# Patient Record
Sex: FEMALE | Race: WHITE | Employment: FULL TIME | URBAN - METROPOLITAN AREA
[De-identification: names, ages, dates, MRNs, and addresses within clinical notes are randomized per-mention and may not be internally consistent; named-entity substitution may affect disease eponyms.]

---

## 2018-01-08 ENCOUNTER — OFFICE VISIT (OUTPATIENT)
Dept: INTERNAL MEDICINE | Facility: CLINIC | Age: 48
End: 2018-01-08
Payer: COMMERCIAL

## 2018-01-08 VITALS
OXYGEN SATURATION: 97 % | DIASTOLIC BLOOD PRESSURE: 78 MMHG | TEMPERATURE: 98.4 F | SYSTOLIC BLOOD PRESSURE: 116 MMHG | BODY MASS INDEX: 48.45 KG/M2 | WEIGHT: 269.2 LBS | HEART RATE: 84 BPM

## 2018-01-08 DIAGNOSIS — N92.6 IRREGULAR MENSTRUAL CYCLE: ICD-10-CM

## 2018-01-08 DIAGNOSIS — Z12.31 VISIT FOR SCREENING MAMMOGRAM: ICD-10-CM

## 2018-01-08 DIAGNOSIS — L30.9 DERMATITIS: ICD-10-CM

## 2018-01-08 DIAGNOSIS — E66.812 CLASS 2 OBESITY IN ADULT, UNSPECIFIED BMI, UNSPECIFIED OBESITY TYPE, UNSPECIFIED WHETHER SERIOUS COMORBIDITY PRESENT: ICD-10-CM

## 2018-01-08 DIAGNOSIS — Z00.00 ENCOUNTER FOR ROUTINE ADULT HEALTH EXAMINATION WITHOUT ABNORMAL FINDINGS: Primary | ICD-10-CM

## 2018-01-08 LAB
ERYTHROCYTE [DISTWIDTH] IN BLOOD BY AUTOMATED COUNT: 14.7 % (ref 10–15)
HCT VFR BLD AUTO: 39.6 % (ref 35–47)
HGB BLD-MCNC: 12.6 G/DL (ref 11.7–15.7)
IRON SATN MFR SERPL: 20 % (ref 15–46)
IRON SERPL-MCNC: 59 UG/DL (ref 35–180)
MCH RBC QN AUTO: 27.6 PG (ref 26.5–33)
MCHC RBC AUTO-ENTMCNC: 31.8 G/DL (ref 31.5–36.5)
MCV RBC AUTO: 87 FL (ref 78–100)
PLATELET # BLD AUTO: 515 10E9/L (ref 150–450)
RBC # BLD AUTO: 4.56 10E12/L (ref 3.8–5.2)
TIBC SERPL-MCNC: 294 UG/DL (ref 240–430)
TSH SERPL DL<=0.005 MIU/L-ACNC: 1.28 MU/L (ref 0.4–4)
WBC # BLD AUTO: 9.5 10E9/L (ref 4–11)

## 2018-01-08 PROCEDURE — 99213 OFFICE O/P EST LOW 20 MIN: CPT | Mod: 25 | Performed by: INTERNAL MEDICINE

## 2018-01-08 PROCEDURE — 83550 IRON BINDING TEST: CPT | Performed by: INTERNAL MEDICINE

## 2018-01-08 PROCEDURE — 77063 BREAST TOMOSYNTHESIS BI: CPT | Mod: TC

## 2018-01-08 PROCEDURE — 85027 COMPLETE CBC AUTOMATED: CPT | Performed by: INTERNAL MEDICINE

## 2018-01-08 PROCEDURE — 36415 COLL VENOUS BLD VENIPUNCTURE: CPT | Performed by: INTERNAL MEDICINE

## 2018-01-08 PROCEDURE — 83540 ASSAY OF IRON: CPT | Performed by: INTERNAL MEDICINE

## 2018-01-08 PROCEDURE — 84443 ASSAY THYROID STIM HORMONE: CPT | Performed by: INTERNAL MEDICINE

## 2018-01-08 PROCEDURE — 99396 PREV VISIT EST AGE 40-64: CPT | Performed by: INTERNAL MEDICINE

## 2018-01-08 PROCEDURE — 77067 SCR MAMMO BI INCL CAD: CPT | Mod: TC

## 2018-01-08 RX ORDER — TRIAMCINOLONE ACETONIDE 5 MG/G
CREAM TOPICAL
Qty: 30 G | Refills: 11 | Status: SHIPPED | OUTPATIENT
Start: 2018-01-08 | End: 2021-10-29

## 2018-01-08 NOTE — MR AVS SNAPSHOT
After Visit Summary   1/8/2018    Emiliana Woodall    MRN: [id number]           Patient Information     Date Of Birth          1970        Visit Information        Provider Department      1/8/2018 8:45 AM Reilly Paige MD [facility]        Today's Diagnoses     Encounter for routine adult health examination without abnormal findings    -  1    Dermatitis        Irregular menstrual cycle        Class 2 obesity in adult, unspecified BMI, unspecified obesity type, unspecified whether serious comorbidity present          Care Instructions      Preventive Health Recommendations  Female Ages 40 to 49    Yearly exam:     See your health care provider every year in order to  1. Review health changes.   2. Discuss preventive care.    3. Review your medicines if your doctor prescribed any.      Get a Pap test every three years (unless you have an abnormal result and your provider advises testing more often).      If you get Pap tests with HPV test, you only need to test every 5 years, unless you have an abnormal result. You do not need a Pap test if your uterus was removed (hysterectomy) and you have not had cancer.      You should be tested each year for STDs (sexually transmitted diseases), if you're at risk.       Ask your doctor if you should have a mammogram.      Have a colonoscopy (test for colon cancer) if someone in your family has had colon cancer or polyps before age 50.       Have a cholesterol test every 5 years.       Have a diabetes test (fasting glucose) after age 45. If you are at risk for diabetes, you should have this test every 3 years.    Shots: Get a flu shot each year. Get a tetanus shot every 10 years.     Nutrition:     Eat at least 5 servings of fruits and vegetables each day.    Eat whole-grain bread, whole-wheat pasta and brown rice instead of white grains and rice.    Talk to your provider about Calcium and Vitamin D.     Lifestyle    Exercise  at least 150 minutes a week (an average of 30 minutes a day, 5 days a week). This will help you control your weight and prevent disease.    Limit alcohol to one drink per day.    No smoking.     Wear sunscreen to prevent skin cancer.    See your dentist every six months for an exam and cleaning.          Follow-ups after your visit        Your next 10 appointments already scheduled     Feb 05, 2018  8:15 AM CST   US PELVIC COMPLETE W TRANSVAGINAL with OXUS1   [facility] ([facility])    [address] [place] [id number] [phone]           Please bring a list of your medicines (including vitamins, minerals and over-the-counter drugs). Also, tell your doctor about any allergies you may have. Wear comfortable clothes and leave your valuables at home.  Adults: Drink six 8-ounce glasses of fluid one hour before your exam. Do NOT empty your bladder.  If you need to empty your bladder before your exam, try to release only a little bit of urine. Then, drink another 8oz glass of fluid.  Children: Children who are potty trained should drink at least 4 cups (32 oz) of liquid 45 minutes to one hour prior to the exam. The child s bladder must be full in order to achieve a diagnostic exam. If your child is very uncomfortable or has an urgent need to pee, please notify a technologist; they will try to find out how much longer the wait may be and provide instructions to help relieve the pressure. Occasionally it is medically necessary to insert a urinary catheter to fill the bladder.  Please call the Imaging Department at your exam site with any questions.              Who to contact     If you have questions or need follow up information about today's clinic visit or your schedule please contact [facility] directly at [phone].  Normal or non-critical lab and imaging results will be communicated to you by MyChart, letter or phone  "within 4 business days after the clinic has received the results. If you do not hear from us within 7 days, please contact the clinic through NVEL[employer] or phone. If you have a critical or abnormal lab result, we will notify you by phone as soon as possible.  Submit refill requests through [employer]EL[employer] or call your pharmacy and they will forward the refill request to us. Please allow 3 business days for your refill to be completed.          Additional Information About Your Visit        NVELO Information     NVELO lets you send messages to your doctor, view your test results, renew your prescriptions, schedule appointments and more. To sign up, go to [email or web address] . Click on \"Log in\" on the left side of the screen, which will take you to the Welcome page. Then click on \"Sign up Now\" on the right side of the page.     You will be asked to enter the access code listed below, as well as some personal information. Please follow the directions to create your username and password.     Your access code is: [email or web address]  Expires: 4/10/2018  9:10 AM     Your access code will  in 90 days. If you need help or a new code, please call your [facility] clinic or [phone].        Care EveryWhere ID     This is your Care EveryWhere ID. This could be used by other organizations to access your [place] medical records  [id number]        Your Vitals Were     Pulse Temperature Pulse Oximetry Breastfeeding? BMI (Body Mass Index)       84 98.4  F (36.9  C) (Oral) 97% No 48.45 kg/m2        Blood Pressure from Last 3 Encounters:   18 116/78   16 110/74   16 110/74    Weight from Last 3 Encounters:   18 269 lb 3.2 oz (122.1 kg)   16 240 lb (108.9 kg)   16 240 lb 12.8 oz (109.2 kg)              We Performed the Following     CBC with platelets     Iron and iron binding capacity     OFFICE/OUTPT VISIT,EST,LEVL III     TSH with free T4 reflex          Today's Medication Changes          These " changes are accurate as of: 1/8/18 11:59 PM.  If you have any questions, ask your nurse or doctor.               Start taking these medicines.        Dose/Directions    naltrexone-bupropion 8-90 MG per 12 hr tablet   Commonly known as:  CONTRAVE   Used for:  Class 2 obesity in adult, unspecified BMI, unspecified obesity type, unspecified whether serious comorbidity present   Started by:  Reilly Paige MD        1 tablet daily x 1 week, then 1 tablet twice daily x 1 week, then  2 tabs qAM and 1 tab q PM x 1 week, then 2 tabs twice daily   Quantity:  120 tablet   Refills:  3            Where to get your medicines      These medications were sent to [place] [address]     Phone:  [phone]     naltrexone-bupropion 8-90 MG per 12 hr tablet    triamcinolone 0.5 % cream                Primary Care Provider Office Phone # Fax #    Reilly Paige [phone]       [address] [place] [address]        Equal Access to Services     DOMITILA LOZADA : Hadii jonah ku hadasho Soomaali, waaxda luqadaha, qaybta kaalmada ademagdielyada, lluvia valenzuela. So [facility] [phone].    ATENCIÓN: Si habla español, tiene a mccall disposición servicios gratuitos de asistencia lingüística. [facility] [phone].    We comply with applicable federal civil rights laws and [place] laws. We do not discriminate on the basis of race, color, national origin, age, disability, sex, sexual orientation, or gender identity.            Thank you!     Thank you for choosing [facility]  for your care. Our goal is always to provide you with excellent care. Hearing back from our patients is one way we can continue to improve our services. Please take a few minutes to complete the written survey that you may receive in the mail after your visit with us. Thank you!             Your Updated Medication  List - Protect others around you: Learn how to safely use, store and throw away your medicines at www.disposemymeds.org.          This list is accurate as of: 1/8/18 11:59 PM.  Always use your most recent med list.                   Brand Name Dispense Instructions for use Diagnosis    naltrexone-bupropion 8-90 MG per 12 hr tablet    CONTRAVE    120 tablet    1 tablet daily x 1 week, then 1 tablet twice daily x 1 week, then  2 tabs qAM and 1 tab q PM x 1 week, then 2 tabs twice daily    Class 2 obesity in adult, unspecified BMI, unspecified obesity type, unspecified whether serious comorbidity present       NO ACTIVE MEDICATIONS          .        triamcinolone 0.5 % cream    KENALOG    30 g    Apply sparingly to affected area three times daily.    Dermatitis

## 2018-01-08 NOTE — PROGRESS NOTES
SUBJECTIVE:   CC: Emiliana Woodall is an 47 year old woman who presents for preventive health visit.     Answers for HPI/ROS submitted by the patient on 1/8/2018   Annual Exam:  Getting at least 3 servings of Calcium per day:: Yes  Bi-annual eye exam:: Yes  Dental care twice a year:: Yes  Sleep apnea or symptoms of sleep apnea:: None  Diet:: Regular (no restrictions)  Frequency of exercise:: 1 day/week  Taking medications regularly:: Yes  Medication side effects:: Not applicable  Additional concerns today:: YES  PHQ-2 Score: 1  Duration of exercise:: 15-30 minutes      PROBLEMS TO ADD ON...  1. Perimenopausal symptoms     Today's PHQ-2 Score:   PHQ-2 ( 1999 Pf[employer]) 1/8/2018 8/31/2016   Q1: Little interest or pleasure in doing things 0 0   Q2: Feeling down, depressed or hopeless 1 0   PHQ-2 Score 1 0   Q1: Little interest or pleasure in doing things Not at all -   Q2: Feeling down, depressed or hopeless Several days -   PHQ-2 Score 1 -         Abuse: Current or Past(Physical, Sexual or Emotional)- No  Do you feel safe in your environment - Yes  Social History   Substance Use Topics     Smoking status: Never Smoker     Smokeless tobacco: Never Used     Alcohol use 0.0 oz/week     0 Standard drinks or equivalent per week      Comment: rarely     If you drink alcohol do you typically have >3 drinks per day or >7 drinks per week? No                     Reviewed orders with patient.  Reviewed health maintenance and updated orders accordingly - Yes      Patient under age 50, mutual decision reflected in health maintenance.        Pertinent mammograms are reviewed under the imaging tab.  History of abnormal Pap smear: NO - age 30-65 PAP every 5 years with negative HPV co-testing recommended    Reviewed and updated as needed this visit by clinical staffTobacco  Allergies  Meds  Med Hx  Surg Hx  Fam Hx  Soc Hx        Reviewed and updated as needed this visit by Provider          Patient continues to struggle with  being overweight, would like to try Contrave therapy.  She has attempted regular exercise, is relatively adherent to reasonable diet, has been through nutritional counseling.    Also complaining of irregular and heavy periods, with associated hot flashes.    ROS:  C: NEGATIVE for fever, chills, change in weight  I: NEGATIVE for worrisome rashes, moles or lesions  E: NEGATIVE for vision changes or irritation  ENT: NEGATIVE for ear, mouth and throat problems  R: NEGATIVE for significant cough or SOB  B: NEGATIVE for masses, tenderness or discharge  CV: NEGATIVE for chest pain, palpitations or peripheral edema  GI: NEGATIVE for nausea, abdominal pain, heartburn, or change in bowel habits   menopausal female: As above  M: NEGATIVE for significant arthralgias or myalgia  N: NEGATIVE for weakness, dizziness or paresthesias  P: NEGATIVE for changes in mood or affect     OBJECTIVE:   /78  Pulse 84  Temp 98.4  F (36.9  C) (Oral)  Wt 269 lb 3.2 oz (122.1 kg)  SpO2 97%  Breastfeeding? No  BMI 48.45 kg/m2  EXAM:  GENERAL: healthy, alert and no distress  NECK: no adenopathy, no asymmetry, masses, or scars and thyroid normal to palpation  RESP: lungs clear to auscultation - no rales, rhonchi or wheezes  CV: regular rate and rhythm, normal S1 S2, no S3 or S4, no murmur, click or rub, no peripheral edema and peripheral pulses strong  ABDOMEN: soft, nontender, no hepatosplenomegaly, no masses and bowel sounds normal  MS: no gross musculoskeletal defects noted, no edema    ASSESSMENT/PLAN:   1. Encounter for routine adult health examination without abnormal findings  Discussed self breast exam, schedule for mammogram.  Discussed importance of regular pelvic exams and PAP smears to check for GYN malignancies.  Discussed cardiac risk factor modification including screening for (and treating if present) cholesterol, HTN, DM, and avoiding smoking.  Recommended a low fat diet and regular aerobic activity.     2.  "Dermatitis    - triamcinolone (KENALOG) 0.5 % cream; Apply sparingly to affected area three times daily.  Dispense: 30 g; Refill: 11    3. Irregular menstrual cycle  Check pelvic ultrasound, check hemoglobin and iron studies as ordered.  - US Pelvic Complete w Transvaginal; Future  - CBC with platelets  - Iron and iron binding capacity  - TSH with free T4 reflex    4. Class 2 obesity in adult, unspecified BMI, unspecified obesity type, unspecified whether serious comorbidity present  Discussed options, will go ahead and start Contrave therapy, will reassess in the next 3 months as long as well tolerated.  If has not lost at least 5% of current weight by that time, unlikely to be of continued benefit  - naltrexone-bupropion (CONTRAVE) 8-90 MG per 12 hr tablet; 1 tablet daily x 1 week, then 1 tablet twice daily x 1 week, then  2 tabs qAM and 1 tab q PM x 1 week, then 2 tabs twice daily  Dispense: 120 tablet; Refill: 3    COUNSELING:   Reviewed preventive health counseling, as reflected in patient instructions         reports that she has never smoked. She has never used smokeless tobacco.    Estimated body mass index is 48.45 kg/(m^2) as calculated from the following:    Height as of 8/31/16: 5' 2.5\" (1.588 m).    Weight as of this encounter: 269 lb 3.2 oz (122.1 kg).   Weight management plan: Discussed healthy diet and exercise guidelines and patient will follow up in 3 months in clinic to re-evaluate.    Counseling Resources:  ATP IV Guidelines  Pooled Cohorts Equation Calculator  Breast Cancer Risk Calculator  FRAX Risk Assessment  ICSI Preventive Guidelines  Dietary Guidelines for Americans, 2010  USDA's MyPlate  ASA Prophylaxis  Lung CA Screening    Reilly Paige MD  [facility]  "

## 2018-01-08 NOTE — LETTER
1/12/2018         Emiliana Woodall  [address] [address]            Dear MsBenito Talisha,    I am writing to inform you of the lab tests you had performed recently.      Your lab results are as follows:    Hemoglobin: NORMAL  Thyroid function: NORMAL  Iron levels: NORMAL    All of your lab testing looks fine - I'll be in touch when I receive the results of your ultrasound.    Thank you for allowing me to participate in your care.  If you have further questions, please contact us at [phone].      Sincerely,        SHAMIR Paige MD  Dept. of Internal Medicine  [facility]

## 2018-01-08 NOTE — NURSING NOTE
"Chief Complaint   Patient presents with     Physical       Initial /78  Pulse 84  Temp 98.4  F (36.9  C) (Oral)  Wt 269 lb 3.2 oz (122.1 kg)  SpO2 97%  Breastfeeding? No  BMI 48.45 kg/m2 Estimated body mass index is 48.45 kg/(m^2) as calculated from the following:    Height as of 8/31/16: 5' 2.5\" (1.588 m).    Weight as of this encounter: 269 lb 3.2 oz (122.1 kg).  Medication Reconciliation: complete   Darline Lugo CMA      "

## 2018-08-08 ENCOUNTER — OFFICE VISIT (OUTPATIENT)
Dept: OBGYN | Facility: CLINIC | Age: 48
End: 2018-08-08
Payer: COMMERCIAL

## 2018-08-08 VITALS
WEIGHT: 272 LBS | SYSTOLIC BLOOD PRESSURE: 104 MMHG | HEIGHT: 63 IN | BODY MASS INDEX: 48.2 KG/M2 | DIASTOLIC BLOOD PRESSURE: 60 MMHG

## 2018-08-08 DIAGNOSIS — N95.1 SYMPTOMATIC MENOPAUSAL OR FEMALE CLIMACTERIC STATES: ICD-10-CM

## 2018-08-08 DIAGNOSIS — L68.0 HIRSUTISM: ICD-10-CM

## 2018-08-08 DIAGNOSIS — E66.01 OBESITY, CLASS III, BMI 40-49.9 (MORBID OBESITY) (H): ICD-10-CM

## 2018-08-08 DIAGNOSIS — N93.9 ABNORMAL UTERINE BLEEDING (AUB): Primary | ICD-10-CM

## 2018-08-08 PROCEDURE — 99396 PREV VISIT EST AGE 40-64: CPT | Performed by: OBSTETRICS & GYNECOLOGY

## 2018-08-08 RX ORDER — NORGESTIMATE AND ETHINYL ESTRADIOL 0.25-0.035
1 KIT ORAL DAILY
Qty: 84 TABLET | Refills: 3 | Status: SHIPPED | OUTPATIENT
Start: 2018-08-08 | End: 2019-01-08

## 2018-08-08 NOTE — NURSING NOTE
"Chief Complaint   Patient presents with     Physical       Initial /60 (BP Location: Left arm, Patient Position: Chair, Cuff Size: Adult Large)  Ht 5' 2.5\" (1.588 m)  Wt 272 lb (123.4 kg)  LMP 2018 (Exact Date)  BMI 48.96 kg/m2 Estimated body mass index is 48.96 kg/(m^2) as calculated from the following:    Height as of this encounter: 5' 2.5\" (1.588 m).    Weight as of this encounter: 272 lb (123.4 kg).  BP completed using cuff size: regular        The following HM Due: NONE      Chey Parker CMA           "

## 2018-08-08 NOTE — PATIENT INSTRUCTIONS
Preventive Health Recommendations  Female Ages 40-64  Yearly exam:    See your health care provider every year in order to    Review health changes.      Discuss preventive care.       Review your medicines if you your doctor has prescribed any.    Pap Smears: You should have a Pap test every 3 years or have a Pap test with HPV screening every 5 years.    You do not need a Pap test if your uterus and cervix were removed (hysterectomy) and you have not had cancer.   Breast Cancer Screening: You should begin mammography for breast cancer screening by age 40 or 50 depending on your personal risks and your doctors recommendation. Screening should occur every year or every other year based on your discussion with your doctor.  Colorectal Cancer Screening: Starting at age 50 you need to undergo colonoscopy (every 5-10 years) or other colon cancer screening.    Health Maintenance:  - Your cholesterol should be checked every 5 years, more often if you have increased risk factors such as diabetes, high blood pressure, tobacco use, obesity, or a strong family history of cardiovascular disease before age 50 in men and 60 in women  - If you are at risk for diabetes due to being overweight or obese, having a strong family history, or having a history of gestational diabetes you should have a diabetes test (fasting glucose or Hemoglobin A1c level) every 3 years.  Shots: Get a flu shot each year. Get a tetanus shot every 10 years.    Nutrition:      Eat at least 5 servings of fruits and vegetables each day.    Eat whole-grain bread, whole-wheat pasta and brown rice instead of white grains and rice.    Consume 1000mg of Calcium and 1000u of Vitamin D daily. If these are not in your regular diet you should take a supplement.    To calculate the calcium in your food add a zero to the percent found on the packaging (ex: 30% = 300mg of calcium per serving)    Good sources of Calcium include:    Low-fat dairy products (200 to 300  milligrams per serving)      Dark green leafy vegetables     Canned salmon or sardines with bones     Soy products, such as tofu     Calcium-fortified cereals and orange juice    Osteopenia is low bone mass, your risk increases once you reach menopause and your calcium needs then increase to 1,200mg daily    The [place] of [employer] recommends that total calcium intake, from supplements and diet combined, should be no more than 2,000 milligrams daily for people older than 50.    Lifestyle    Get in at least 150 minutes of physical activity a week (30 minutes a day, 5 days of the week), of which about half should be vigorous exercise (jogging, swimming, lifting weights).  This will help you control your weight and prevent disease.    Limit alcohol to one drink per day.    No smoking.      Wear sunscreen to prevent skin cancer.    See your dentist every six months for an exam and cleaning        Menopause and Perimenopause    Hot flashes, night sweats, mood changes, sleep disturbances, changes in the menstrual periods, decreased interest in sex, vaginal dryness or painful sex, and changes in the skin and hair are all common symptoms of perimenopause (the period of time, lasting several years, leading up to menopause). Menopause is defined as 12 months without a menstrual period.     It might be helpful to make a list of your symptoms, and to rank them in order of how much they bother you, or which ones you would fix first if you could. This can help us decide what treatment options might be best for you. Treatment can include one or more of the following: lifestyle changes like diet and exercise, supplements, prescription medications for hormones, and other prescriptions that are nonhormonal. We will discuss these in more detail after your test results (if you are having lab work done) are available, when you come back. Some tests that might be done for women in perimenopause include blood work for hormone levels and  to check the thyroid or ultrasound or biopsy of the lining of the uterus (if you are having abnormal bleeding).

## 2018-08-08 NOTE — PROGRESS NOTES
Emiliana is a 48 year old  female who presents for annual exam.     Besides routine health maintenance,  she would like to discuss menopausal symptoms.    HPI:  The patient's PCP is  Reilly Paige MD.   Hot flashes for the last year.   Night sweats occurring.  Periods are longer and heavier -- passing small clots. Regular.  No longer experiencing cramping.  New facial hair growth.  Moods are irregular the last 10 months. Spontaneously crying.    Tried Contrave for weight loss. Had stomach upset and stopped after two days.     Thyroid was normal in .     GYNECOLOGIC HISTORY:    Patient's last menstrual period was 2018 (exact date).  Her current contraception method is: none.  She  reports that she has never smoked. She has never used smokeless tobacco.    Patient is sexually active.  STD testing offered?  Declined  Last PHQ-9 score on record = No flowsheet data found.  Last GAD7 score on record = No flowsheet data found.  Alcohol Score = 0    HEALTH MAINTENANCE:  Cholesterol:   Last Mammo: one year ago, Result: normal, Next in 2019   Pap: 2016 Neg, Neg --- not due  Lab Results   Component Value Date    PAP NIL 2016    PAP NIL 2014    PAP NIL 2013    Colonoscopy:  Never  Dexa:  Never    Health maintenance updated:  no    HISTORY:  Obstetric History       T0      L0     SAB0   TAB1   Ectopic0   Multiple0   Live Births0       # Outcome Date GA Lbr Billy/2nd Weight Sex Delivery Anes PTL Lv   1 TAB                   Patient Active Problem List   Diagnosis     NO ACTIVE PROBLEMS     Open wound of hand     Family history of malignant neoplasm of breast     Neck pain     Low back pain     CARDIOVASCULAR SCREENING; LDL GOAL LESS THAN 160     History of rheumatic fever     Encounter for routine gynecological examination     Routine general medical examination at a health care facility     ASCUS on Pap smear     Low risk HPV infection     Obesity     Mood swings  "(H)     Past Surgical History:   Procedure Laterality Date     HC REMOVE TONSILS/ADENOIDS,<11 Y/O        Social History   Substance Use Topics     Smoking status: Never Smoker     Smokeless tobacco: Never Used     Alcohol use 0.0 oz/week     0 Standard drinks or equivalent per week      Comment: rarely      Problem (# of Occurrences) Relation (Name,Age of Onset)    Alcohol/Drug (1) Father    Breast Cancer (1) Maternal Aunt    C.A.D. (1) Paternal Grandfather:  of Heart Disease, hs had 8 Heart Attacks    Cancer (1) Maternal Grandfather: Brain    Eye Disorder (1) Paternal Grandmother: Glaucoma    Hypertension (2) Paternal Grandmother, Father    Osteoperosis (2) Mother, Maternal Grandmother            Current Outpatient Prescriptions   Medication Sig     triamcinolone (KENALOG) 0.5 % cream Apply sparingly to affected area three times daily. (Patient not taking: Reported on 2018)     No current facility-administered medications for this visit.      Allergies   Allergen Reactions     No Known Allergies        Past medical, surgical, social and family histories were reviewed and updated in EPIC.    ROS:   12 point review of systems negative other than symptoms noted below.    EXAM:  /60 (BP Location: Left arm, Patient Position: Chair, Cuff Size: Adult Large)  Ht 5' 2.5\" (1.588 m)  Wt 272 lb (123.4 kg)  LMP 2018 (Exact Date)  BMI 48.96 kg/m2   BMI: Body mass index is 48.96 kg/(m^2).    PHYSICAL EXAM:  Constitutional:  Appearance: Well nourished, well developed, alert, in no acute distress. Morbid obesity  Neck:  Lymph Nodes:  No lymphadenopathy present    Thyroid:  Gland size normal, nontender, no nodules or masses present  on palpation  Chest:  Respiratory Effort:  Breathing unlabored  Cardiovascular:    Heart: Auscultation:  Regular rate, normal rhythm, no murmurs present  Breasts: Inspection of Breasts:  No lymphadenopathy present., Palpation of Breasts and Axillae:  No masses present on " palpation, no breast tenderness., Axillary Lymph Nodes:  No lymphadenopathy present. and No nodularity, asymmetry or nipple discharge bilaterally.  Gastrointestinal:   Abdominal Examination:  Abdomen nontender to palpation, tone normal without rigidity or guarding, no masses present, umbilicus without lesions   Liver and Spleen:  No hepatomegaly present, liver nontender to palpation    Hernias:  No hernias present  Lymphatic: Lymph Nodes:  No other lymphadenopathy present  Skin:  General Inspection:  No rashes present, no lesions present, no areas of  discoloration    Genitalia and Groin:  No rashes present, no lesions present, no areas of  discoloration, no masses present  Neurologic/Psychiatric:    Mental Status:  Oriented X3     Pelvic Exam:  External Genitalia:     Normal appearance for age, no discharge present, no tenderness present, no inflammatory lesions present, color normal  Vagina:     Normal vaginal vault without central or paravaginal defects, no discharge present, no inflammatory lesions present, no masses present  Bladder:     Nontender to palpation  Urethra:   Urethral Body:  Urethra palpation normal, urethra structural support normal   Urethral Meatus:  No erythema or lesions present  Cervix:     Appearance healthy, no lesions present, nontender to palpation, no bleeding present  Uterus:     Uterus: firm, normal sized and non-tender.  Adnexa:     No adnexal tenderness present, no adnexal masses present  Perineum:     Perineum within normal limits, no evidence of trauma, no rashes or skin lesions present  Anus:     Anus within normal limits, no hemorrhoids present  Inguinal Lymph Nodes:     No lymphadenopathy present  Pubic Hair:     Normal pubic hair distribution for age  Genitalia and Groin:     No rashes present, no lesions present, no areas of discoloration, no masses present    Extremities: slight inner leg acanthosis nigricans     COUNSELING:   Reviewed preventive health counseling, as  reflected in patient instructions    BMI: Body mass index is 48.96 kg/(m^2).  Weight management plan: Discussed healthy diet and exercise guidelines and patient will follow up in 12 months in clinic to re-evaluate.    ASSESSMENT:  48 year old female with satisfactory annual exam.    ICD-10-CM    1. Abnormal uterine bleeding (AUB) N93.9 norgestimate-ethinyl estradiol (ORTHO-CYCLEN, SPRINTEC) 0.25-35 MG-MCG per tablet   2. Hirsutism L68.0 norgestimate-ethinyl estradiol (ORTHO-CYCLEN, SPRINTEC) 0.25-35 MG-MCG per tablet   3. Symptomatic menopausal or female climacteric states N95.1 norgestimate-ethinyl estradiol (ORTHO-CYCLEN, SPRINTEC) 0.25-35 MG-MCG per tablet   4. Obesity, Class III, BMI 40-49.9 (morbid obesity) (H) E66.01        PLAN:    Discussed weight loss. Discussed risk of hyperplasia and uterine cancer.   A1c was 5.8 in Aug 2016.   Patient plans to follow up in 2-3 months to evaluate effectiveness of oral contraceptive pills (OCPs).  Recommend repeat diabetes screening at that time in light of obesity and development of slight inner leg acanthosis nigricans on exam today.   Patient desires to hold on pelvic ultrasound at this time. If continued heavy periods despite oral contraceptive pills (OCPs), will consider ultrasound at that time.     Zee Turcios, DO

## 2018-08-08 NOTE — MR AVS SNAPSHOT
After Visit Summary   8/8/2018    Emiliana Woodall    MRN: [id number]           Patient Information     Date Of Birth          1970        Visit Information        Provider Department      8/8/2018 3:30 PM Zee Turcios DO [facility]        Today's Diagnoses     Abnormal uterine bleeding (AUB)    -  1    Hirsutism        Symptomatic menopausal or female climacteric states          Care Instructions    Preventive Health Recommendations  Female Ages 40-64  Yearly exam:    See your health care provider every year in order to    Review health changes.      Discuss preventive care.       Review your medicines if you your doctor has prescribed any.    Pap Smears: You should have a Pap test every 3 years or have a Pap test with HPV screening every 5 years.    You do not need a Pap test if your uterus and cervix were removed (hysterectomy) and you have not had cancer.   Breast Cancer Screening: You should begin mammography for breast cancer screening by age 40 or 50 depending on your personal risks and your doctors recommendation. Screening should occur every year or every other year based on your discussion with your doctor.  Colorectal Cancer Screening: Starting at age 50 you need to undergo colonoscopy (every 5-10 years) or other colon cancer screening.    Health Maintenance:  - Your cholesterol should be checked every 5 years, more often if you have increased risk factors such as diabetes, high blood pressure, tobacco use, obesity, or a strong family history of cardiovascular disease before age 50 in men and 60 in women  - If you are at risk for diabetes due to being overweight or obese, having a strong family history, or having a history of gestational diabetes you should have a diabetes test (fasting glucose or Hemoglobin A1c level) every 3 years.  Shots: Get a flu shot each year. Get a tetanus shot every 10 years.    Nutrition:      Eat at least 5 servings of fruits  and vegetables each day.    Eat whole-grain bread, whole-wheat pasta and brown rice instead of white grains and rice.    Consume 1000mg of Calcium and 1000u of Vitamin D daily. If these are not in your regular diet you should take a supplement.    To calculate the calcium in your food add a zero to the percent found on the packaging (ex: 30% = 300mg of calcium per serving)    Good sources of Calcium include:    Low-fat dairy products (200 to 300 milligrams per serving)      Dark green leafy vegetables     Canned salmon or sardines with bones     Soy products, such as tofu     Calcium-fortified cereals and orange juice    Osteopenia is low bone mass, your risk increases once you reach menopause and your calcium needs then increase to 1,200mg daily    The [employer] recommends that total calcium intake, from supplements and diet combined, should be no more than 2,000 milligrams daily for people older than 50.    Lifestyle    Get in at least 150 minutes of physical activity a week (30 minutes a day, 5 days of the week), of which about half should be vigorous exercise (jogging, swimming, lifting weights).  This will help you control your weight and prevent disease.    Limit alcohol to one drink per day.    No smoking.      Wear sunscreen to prevent skin cancer.    See your dentist every six months for an exam and cleaning        Menopause and Perimenopause    Hot flashes, night sweats, mood changes, sleep disturbances, changes in the menstrual periods, decreased interest in sex, vaginal dryness or painful sex, and changes in the skin and hair are all common symptoms of perimenopause (the period of time, lasting several years, leading up to menopause). Menopause is defined as 12 months without a menstrual period.     It might be helpful to make a list of your symptoms, and to rank them in order of how much they bother you, or which ones you would fix first if you could. This can help us decide what treatment  "options might be best for you. Treatment can include one or more of the following: lifestyle changes like diet and exercise, supplements, prescription medications for hormones, and other prescriptions that are nonhormonal. We will discuss these in more detail after your test results (if you are having lab work done) are available, when you come back. Some tests that might be done for women in perimenopause include blood work for hormone levels and to check the thyroid or ultrasound or biopsy of the lining of the uterus (if you are having abnormal bleeding).           Follow-ups after your visit        Follow-up notes from your care team     Return in about 3 months (around 11/8/2018).      Who to contact     If you have questions or need follow up information about today's clinic visit or your schedule please contact [facility] directly at [phone].  Normal or non-critical lab and imaging results will be communicated to you by ONStorhart, letter or phone within 4 business days after the clinic has received the results. If you do not hear from us within 7 days, please contact the clinic through MyChart or phone. If you have a critical or abnormal lab result, we will notify you by phone as soon as possible.  Submit refill requests through [employer] or call your pharmacy and they will forward the refill request to us. Please allow 3 business days for your refill to be completed.          Additional Information About Your Visit        [employer] Information     [employer] lets you send messages to your doctor, view your test results, renew your prescriptions, schedule appointments and more. To sign up, go to [email or web address] . Click on \"Log in\" on the left side of the screen, which will take you to the Welcome page. Then click on \"Sign up Now\" on the right side of the page.     You will be asked to enter the access code listed below, as well as some personal information. Please follow the " "directions to create your username and password.     Your access code is: [email or web address]  Expires: 2018  4:12 PM     Your access code will  in 90 days. If you need help or a new code, please call your [facility] clinic or [phone].        Care EveryWhere ID     This is your Care EveryWhere ID. This could be used by other organizations to access your [facility] medical records  [id number]        Your Vitals Were     Height Last Period BMI (Body Mass Index)             5' 2.5\" (1.588 m) 2018 (Exact Date) 48.96 kg/m2          Blood Pressure from Last 3 Encounters:   18 104/60   18 116/78   16 110/74    Weight from Last 3 Encounters:   18 272 lb (123.4 kg)   18 269 lb 3.2 oz (122.1 kg)   16 240 lb (108.9 kg)              Today, you had the following     No orders found for display         Today's Medication Changes          These changes are accurate as of 18  4:12 PM.  If you have any questions, ask your nurse or doctor.               Start taking these medicines.        Dose/Directions    norgestimate-ethinyl estradiol 0.25-35 MG-MCG per tablet   Commonly known as:  ORTHO-CYCLEN, SPRINTEC   Used for:  Abnormal uterine bleeding (AUB), Hirsutism, Symptomatic menopausal or female climacteric states   Started by:  Zee Turcios DO        Dose:  1 tablet   Take 1 tablet by mouth daily   Quantity:  84 tablet   Refills:  3         Stop taking these medicines if you haven't already. Please contact your care team if you have questions.     naltrexone-bupropion 8-90 MG per 12 hr tablet   Commonly known as:  CONTRAVE   Stopped by:  Zee Turcios DO           NO ACTIVE MEDICATIONS   Stopped by:  Zee Turcios DO                Where to get your medicines      These medications were sent to [facility] [address] [address]     Phone:  [phone]     norgestimate-ethinyl estradiol 0.25-35 " MG-MCG per tablet                Primary Care Provider Office Phone # Fax #    Reilly Pravin Paige [phone] [address] [address]        Equal Access to Services     PATIENCE LOZADA : Hadob jonah jackson nikko Soamiali, waaxda luqadaha, qaybta kaalmada adeegyada, lluvia santillan ankit valenzuela. So [facility] [phone].    ATENCIÓN: Si habla español, tiene a mccall disposición servicios gratuitos de asistencia lingüística. Llame al [phone].    We comply with applicable federal civil rights laws and [place] laws. We do not discriminate on the basis of race, color, national origin, age, disability, sex, sexual orientation, or gender identity.            Thank you!     Thank you for choosing [facility]  for your care. Our goal is always to provide you with excellent care. Hearing back from our patients is one way we can continue to improve our services. Please take a few minutes to complete the written survey that you may receive in the mail after your visit with us. Thank you!             Your Updated Medication List - Protect others around you: Learn how to safely use, store and throw away your medicines at www.disposemymeds.org.          This list is accurate as of 8/8/18  4:12 PM.  Always use your most recent med list.                   Brand Name Dispense Instructions for use Diagnosis    norgestimate-ethinyl estradiol 0.25-35 MG-MCG per tablet    ORTHO-CYCLEN, SPRINTEC    84 tablet    Take 1 tablet by mouth daily    Abnormal uterine bleeding (AUB), Hirsutism, Symptomatic menopausal or female climacteric states       triamcinolone 0.5 % cream    KENALOG    30 g    Apply sparingly to affected area three times daily.    Dermatitis

## 2019-01-08 DIAGNOSIS — N93.9 ABNORMAL UTERINE BLEEDING (AUB): ICD-10-CM

## 2019-01-08 DIAGNOSIS — L68.0 HIRSUTISM: ICD-10-CM

## 2019-01-08 DIAGNOSIS — N95.1 SYMPTOMATIC MENOPAUSAL OR FEMALE CLIMACTERIC STATES: ICD-10-CM

## 2019-01-08 RX ORDER — NORGESTIMATE AND ETHINYL ESTRADIOL 0.25-0.035
1 KIT ORAL DAILY
Qty: 84 TABLET | Refills: 1 | Status: SHIPPED | OUTPATIENT
Start: 2019-01-08 | End: 2021-10-29

## 2019-01-08 NOTE — TELEPHONE ENCOUNTER
Requested Prescriptions   Pending Prescriptions Disp Refills     norgestimate-ethinyl estradiol (ORTHO-CYCLEN/SPRINTEC) 0.25-35 MG-MCG tablet 84 tablet 3     Sig: Take 1 tablet by mouth daily    There is no refill protocol information for this order        Last Written Prescription Date:  8/8/18  Last Fill Quantity: 84,  # refills: 3   Last office visit: 1/8/2018 with prescribing provider:  8/8/18   Future Office Visit:   Next 5 appointments (look out 90 days)    Mar 27, 2019  1:00 PM CDT  [facility] [address] [place] [phone]   Mar 27, 2019  1:30 PM CDT  Office Visit with Zee Turcios DO  [facility] [address] [place] [phone]           Prescription approved per [facility] Refill Protocol.

## 2021-10-29 ENCOUNTER — ANCILLARY PROCEDURE (OUTPATIENT)
Dept: MAMMOGRAPHY | Facility: CLINIC | Age: 51
End: 2021-10-29
Payer: COMMERCIAL

## 2021-10-29 ENCOUNTER — OFFICE VISIT (OUTPATIENT)
Dept: OBGYN | Facility: CLINIC | Age: 51
End: 2021-10-29
Payer: COMMERCIAL

## 2021-10-29 VITALS — SYSTOLIC BLOOD PRESSURE: 122 MMHG | DIASTOLIC BLOOD PRESSURE: 86 MMHG | WEIGHT: 258 LBS | BODY MASS INDEX: 46.44 KG/M2

## 2021-10-29 DIAGNOSIS — Z11.59 ENCOUNTER FOR SCREENING FOR OTHER VIRAL DISEASES: ICD-10-CM

## 2021-10-29 DIAGNOSIS — Z12.31 VISIT FOR SCREENING MAMMOGRAM: ICD-10-CM

## 2021-10-29 DIAGNOSIS — Z13.6 CARDIOVASCULAR SCREENING; LDL GOAL LESS THAN 100: ICD-10-CM

## 2021-10-29 DIAGNOSIS — N93.9 ABNORMAL UTERINE BLEEDING (AUB): ICD-10-CM

## 2021-10-29 DIAGNOSIS — Z01.419 ENCOUNTER FOR GYNECOLOGICAL EXAMINATION WITHOUT ABNORMAL FINDING: ICD-10-CM

## 2021-10-29 DIAGNOSIS — E66.01 MORBID OBESITY (H): ICD-10-CM

## 2021-10-29 DIAGNOSIS — L30.9 DERMATITIS: ICD-10-CM

## 2021-10-29 DIAGNOSIS — Z12.11 COLON CANCER SCREENING: ICD-10-CM

## 2021-10-29 DIAGNOSIS — D75.839 THROMBOCYTOSIS: ICD-10-CM

## 2021-10-29 DIAGNOSIS — Z12.4 SCREENING FOR MALIGNANT NEOPLASM OF CERVIX: Primary | ICD-10-CM

## 2021-10-29 LAB
ALBUMIN SERPL-MCNC: 3.7 G/DL (ref 3.4–5)
ALP SERPL-CCNC: 74 U/L (ref 40–150)
ALT SERPL W P-5'-P-CCNC: 31 U/L (ref 0–50)
ANION GAP SERPL CALCULATED.3IONS-SCNC: 5 MMOL/L (ref 3–14)
AST SERPL W P-5'-P-CCNC: 14 U/L (ref 0–45)
BASOPHILS # BLD AUTO: 0.1 10E3/UL (ref 0–0.2)
BASOPHILS NFR BLD AUTO: 1 %
BILIRUB SERPL-MCNC: 0.4 MG/DL (ref 0.2–1.3)
BUN SERPL-MCNC: 11 MG/DL (ref 7–30)
CALCIUM SERPL-MCNC: 8.4 MG/DL (ref 8.5–10.1)
CHLORIDE BLD-SCNC: 107 MMOL/L (ref 94–109)
CHOLEST SERPL-MCNC: 234 MG/DL
CO2 SERPL-SCNC: 26 MMOL/L (ref 20–32)
CREAT SERPL-MCNC: 0.61 MG/DL (ref 0.52–1.04)
EOSINOPHIL # BLD AUTO: 0.3 10E3/UL (ref 0–0.7)
EOSINOPHIL NFR BLD AUTO: 3 %
ERYTHROCYTE [DISTWIDTH] IN BLOOD BY AUTOMATED COUNT: 14.7 % (ref 10–15)
FASTING STATUS PATIENT QL REPORTED: YES
GFR SERPL CREATININE-BSD FRML MDRD: >90 ML/MIN/1.73M2
GLUCOSE BLD-MCNC: 215 MG/DL (ref 70–99)
HCT VFR BLD AUTO: 40.4 % (ref 35–47)
HDLC SERPL-MCNC: 51 MG/DL
HGB BLD-MCNC: 13.5 G/DL (ref 11.7–15.7)
LDLC SERPL CALC-MCNC: 159 MG/DL
LYMPHOCYTES # BLD AUTO: 2.5 10E3/UL (ref 0.8–5.3)
LYMPHOCYTES NFR BLD AUTO: 30 %
MCH RBC QN AUTO: 28 PG (ref 26.5–33)
MCHC RBC AUTO-ENTMCNC: 33.4 G/DL (ref 31.5–36.5)
MCV RBC AUTO: 84 FL (ref 78–100)
MONOCYTES # BLD AUTO: 0.7 10E3/UL (ref 0–1.3)
MONOCYTES NFR BLD AUTO: 8 %
NEUTROPHILS # BLD AUTO: 4.9 10E3/UL (ref 1.6–8.3)
NEUTROPHILS NFR BLD AUTO: 58 %
NONHDLC SERPL-MCNC: 183 MG/DL
PLATELET # BLD AUTO: 516 10E3/UL (ref 150–450)
POTASSIUM BLD-SCNC: 4 MMOL/L (ref 3.4–5.3)
PROT SERPL-MCNC: 7.4 G/DL (ref 6.8–8.8)
RBC # BLD AUTO: 4.83 10E6/UL (ref 3.8–5.2)
SODIUM SERPL-SCNC: 138 MMOL/L (ref 133–144)
TRIGL SERPL-MCNC: 119 MG/DL
WBC # BLD AUTO: 8.4 10E3/UL (ref 4–11)

## 2021-10-29 PROCEDURE — 85025 COMPLETE CBC W/AUTO DIFF WBC: CPT | Performed by: OBSTETRICS & GYNECOLOGY

## 2021-10-29 PROCEDURE — 80061 LIPID PANEL: CPT | Performed by: OBSTETRICS & GYNECOLOGY

## 2021-10-29 PROCEDURE — 87624 HPV HI-RISK TYP POOLED RSLT: CPT | Performed by: OBSTETRICS & GYNECOLOGY

## 2021-10-29 PROCEDURE — 77067 SCR MAMMO BI INCL CAD: CPT | Mod: TC | Performed by: RADIOLOGY

## 2021-10-29 PROCEDURE — 80053 COMPREHEN METABOLIC PANEL: CPT | Performed by: OBSTETRICS & GYNECOLOGY

## 2021-10-29 PROCEDURE — 77063 BREAST TOMOSYNTHESIS BI: CPT | Mod: TC | Performed by: RADIOLOGY

## 2021-10-29 PROCEDURE — 36415 COLL VENOUS BLD VENIPUNCTURE: CPT | Performed by: OBSTETRICS & GYNECOLOGY

## 2021-10-29 PROCEDURE — G0145 SCR C/V CYTO,THINLAYER,RESCR: HCPCS | Performed by: OBSTETRICS & GYNECOLOGY

## 2021-10-29 PROCEDURE — 99213 OFFICE O/P EST LOW 20 MIN: CPT | Mod: 25 | Performed by: OBSTETRICS & GYNECOLOGY

## 2021-10-29 PROCEDURE — 99386 PREV VISIT NEW AGE 40-64: CPT | Performed by: OBSTETRICS & GYNECOLOGY

## 2021-10-29 RX ORDER — TRIAMCINOLONE ACETONIDE 5 MG/G
CREAM TOPICAL
Qty: 30 G | Refills: 11 | Status: SHIPPED | OUTPATIENT
Start: 2021-10-29 | End: 2024-04-19

## 2021-10-29 NOTE — PATIENT INSTRUCTIONS
You can reach your [facility] Care Team any time of the day by calling [phone]. This number will put you in touch with the 24 hour nurse line if the clinic is closed.    To contact your OB/GYN Station Coordinator/Surgery Scheduler please call [phone]. This is a direct number for your care team between 8 a.m. and 4 p.m. Monday through Friday.    [facility] is open for your convenience:  Monday through Friday 8 a.m. to 6 p.m.  Closed weekends and all major holidays.

## 2021-10-29 NOTE — PROGRESS NOTES
HPI:  Emiliana Woodall is a 51 year old white female  ,condoms and infrequent sexual activity for contraception who presents for an annual exam and pap.  She is experiencing several other ongoing health concerns.  The patient states that she will go to 2 to 3 months without a menses and flow very heavily for 2 weeks.  Most recently she is gone 4 months without a menses and then on 10/15/2021 began flowing heavily for 10 days.  She denies vasovagal symptoms signs of symptoms of anemia cervical atypia or related endocrinopathy.  The patient also has a history of recurring left shoulder dislocation with minimal provocation.  She is not had any therapy for this.  I did discuss having her see Dr. Andrew Hays or Dr. Denilson Tom orthopedic surgeons who specialize in shoulder disorders for evaluation and treatment options.  The patient also notices a rash over the surface of her left foot and ankle region that is pruritic without other known precipitating event.  In the past she has been treated with a steroid cream with resolution of her symptoms.  She is presently out of this medication.  Another concern the patient has is pain in her right knee.  She has tried conservative over-the-counter therapies without improvement.  The pain can at times radiate to her foot and ankle on the right side.  She has no other neurologic deficit.  The patient states that when she goes up stairs she has to do it 1 step at a time.  I have advised seeing orthopedic surgery in consultation for this.  I reviewed her previous Pap mammogram colonoscopy and lipid screening results  Self breast exam,  ACS screening mammogram recs, the use of 81 mg ASA to decrease the risk of heart disease, lipid screening, colon cancer screening recs and Dexa scan recs thoroughly reveiwed.      Past Medical History:   Diagnosis Date     ASCUS with positive high risk HPV 2005    #45 high risk     Atypical squamous cells of undetermined  significance (ASCUS) on Papanicolaou smear of cervix 07/10/03    neg HPV     History of colposcopy with cervical biopsy 2005    JUSTINE 1     Hypertrophy of tonsil with adenoids      Low risk HPV infection 2013     RECUR DISLOCAT-shoulder left      Rheumatic pneumonia(517.1) 1973    rheumatic fever     Past Surgical History:   Procedure Laterality Date     HC REMOVE TONSILS/ADENOIDS,<11 Y/O  1973     wisdom teeth      age 18     Family History   Problem Relation Age of Onset     Osteoporosis Mother      Alcohol/Drug Father      Hypertension Father      Osteoporosis Maternal Grandmother      Cancer Maternal Grandfather         Brain     Hypertension Paternal Grandmother      Eye Disorder Paternal Grandmother         Glaucoma     C.A.D. Paternal Grandfather          of Heart Disease, hs had 8 Heart Attacks     Breast Cancer Maternal Aunt      Social History     Socioeconomic History     Marital status: Single     Spouse name: Not on file     Number of children: 0     Years of education: Not on file     Highest education level: Not on file   Occupational History     Employer: [employer]   Tobacco Use     Smoking status: Never Smoker     Smokeless tobacco: Never Used   Vaping Use     Vaping Use: Never used   Substance and Sexual Activity     Alcohol use: Yes     Alcohol/week: 0.0 standard drinks     Comment: rarely     Drug use: No     Sexual activity: Yes     Partners: Male     Birth control/protection: Condom   Other Topics Concern     Parent/sibling w/ CABG, MI or angioplasty before 65F 55M? Not Asked   Social History Narrative     Not on file     Social Determinants of Health     Financial Resource Strain:      Difficulty of Paying Living Expenses:    Food Insecurity:      Worried About Running Out of Food in the Last Year:      Ran Out of Food in the Last Year:    Transportation Needs:      Lack of Transportation (Medical):      Lack of Transportation (Non-Medical):    Physical  Activity:      Days of Exercise per Week:      Minutes of Exercise per Session:    Stress:      Feeling of Stress :    Social Connections:      Frequency of Communication with Friends and Family:      Frequency of Social Gatherings with Friends and Family:      Attends Zoroastrian Services:      Active Member of Clubs or Organizations:      Attends Club or Organization Meetings:      Marital Status:    Intimate Partner Violence:      Fear of Current or Ex-Partner:      Emotionally Abused:      Physically Abused:      Sexually Abused:        Allergies:  No known allergies    Current Outpatient Medications   Medication Sig Dispense Refill     triamcinolone (ARISTOCORT HP) 0.5 % external cream Apply sparingly to affected area three times daily. 30 g 11       ROS: ROS: 10 point ROS neg other than the symptoms noted above in the HPI.    EXAM:  Vitals: /86   Wt 117 kg (258 lb)   LMP 10/15/2021   BMI 46.44 kg/m    BMI= Body mass index is 46.44 kg/m .  Constitutional: healthy, alert and mild distress  Head: Normocephalic. No masses, lesions, tenderness or abnormalities  Neck: Neck supple. No adenopathy. Thyroid symmetric, normal size,, Carotids without bruits.  ENT: NEGATIVE for ear, mouth and throat problems  Breast:  breasts symmetric, no dominant or suspicious mass, no skin or nipple changes, no axillary adenopathy, unchanged from previous exam or self exam in taught and encouraged  Cardiovascular: negative, PMI normal. No lifts, heaves, or thrills. RRR. No murmurs, clicks gallops or rub  Respiratory: negative, Percussion normal. Good diaphragmatic excursion. Lungs clear  Gastrointestinal: Abdomen soft, non-tender. BS normal. No masses, organomegaly  Genitourinary: Pelvic Exam:  External Genitalia:     Normal appearance for age, no discharge present, no tenderness present, no inflammatory lesions present, color normal  Vagina:     Normal vaginal vault without central or paravaginal defects, no discharge present,  no inflammatory lesions present, no masses present  Bladder:     Nontender to palpation  Urethra:   Urethral Body:  Urethra palpation normal, urethra structural support normal   Urethral Meatus:  No erythema or lesions present  Cervix:     Appearance healthy, no lesions present, nontender to palpation, no bleeding present  Uterus:     Uterus: firm, normal sized and nontender, midplane in position.   Adnexa:     No adnexal tenderness present, no adnexal masses present  Perineum:     Perineum within normal limits, no evidence of trauma, no rashes or skin lesions present  Anus:     Anus within normal limits, no hemorrhoids present  Inguinal Lymph Nodes:     No lymphadenopathy present  Pubic Hair:     Normal pubic hair distribution for age  Genitalia and Groin:     No rashes present, no lesions present, no areas of discoloration, no masses present    Musculoskeletalextremities normal- no gross deformities noted, gait normal and normal muscle tone  Integument: The patient notices and today I found a maculopapular rash over the anterior surface of her left foot and ankle region without vesicles or other obvious infectious etiologies.  No signs of malignancy  Neurologic: Gait normal. Reflexes normal and symmetric. Sensation grossly WNL.  Psychiatric: mentation appears normal and affect normal/bright  Hematologic/Lymphatic/Immunologic: Normal cervical lymph nodes     ASSESSMENT:/PLAN:  (Z12.4) Screening for malignant neoplasm of cervix  (primary encounter diagnosis)  Comment: Recommendations reviewed  Plan: Pap imaged thin layer screen with HPV -         recommended age 30 - 65 years (select HPV order        below)        Done    (Z01.419) Encounter for gynecological examination without abnormal finding  Comment: Otherwise GYN exam is unremarkable.  I discussed her abnormal uterine bleeding and the different diagnostic possibilities and evaluation that should be undertaken  Plan: I recommend the patient have a  sonohysterogram and endometrial biopsy to rule out atypia.  When those results are available we can develop an appropriate therapy plan    (Z13.6) CARDIOVASCULAR SCREENING; LDL GOAL LESS THAN 100  Comment: Previous values reviewed  Plan: Lipid panel reflex to direct LDL Fasting,         Comprehensive metabolic panel (BMP + Alb, Alk         Phos, ALT, AST, Total. Bili, TP)        Lab ordered today    (L30.9) Dermatitis  Comment: This appears to be a superficial dermatitis that has responded well to triamcinolone cream in the past  Plan: triamcinolone (ARISTOCORT HP) 0.5 % external         cream        Refill given if she is not doing better she will see dermatology    (Z12.11) Colon cancer screening  Comment: Recommendations reviewed  Plan: Adult Gastro Ref - Procedure Only        Ordered    (D75.839) Thrombocytosis  Comment: I reviewed her past history of thrombocytosis  Plan: CBC with platelets and differential        I will recheck labs today with appropriate therapy to follow    (N93.9) Abnormal uterine bleeding (AUB)  Comment: Risks, benefits, and alternative modes of therapy discussed at length. Pathophysiology of the disease process reviewed, all of the patients questions answered and informed consent obtained.    Plan: US Hysterosonography        She will schedule this and an endometrial biopsy for evaluation    (E66.01) Morbid obesity (H)  Comment: I believe her knee pain may in part be related to obesity.  I did ask her to see orthopedics for further treatment and evaluation  Plan: Written plan given and a written outline of our discussion was given to her as well      Sherwin Gómez M.D.

## 2021-10-29 NOTE — NURSING NOTE
"Chief Complaint   Patient presents with     Physical       Initial /86   Wt 117 kg (258 lb)   LMP 10/15/2021   BMI 46.44 kg/m   Estimated body mass index is 46.44 kg/m  as calculated from the following:    Height as of 18: 1.588 m (5' 2.5\").    Weight as of this encounter: 117 kg (258 lb).  BP completed using cuff size: regular    Questioned patient about current smoking habits.  Pt. has never smoked.          The following HM Due: pap smear  colonoscopy/FIT      The following patient reported/Care Every where data was sent to:  P ABSTRACT QUALITY INITIATIVES [[id number]]        Marisel Mix CMA                 "

## 2021-10-31 PROBLEM — E66.01 MORBID OBESITY (H): Status: ACTIVE | Noted: 2021-10-31

## 2021-11-02 LAB
BKR LAB AP GYN ADEQUACY: NORMAL
BKR LAB AP GYN INTERPRETATION: NORMAL
BKR LAB AP GYN OTHER FINDINGS: NORMAL
BKR LAB AP HPV REFLEX: NORMAL
BKR LAB AP LMP: NORMAL
BKR LAB AP PREVIOUS ABNORMAL: NORMAL
PATH REPORT.COMMENTS IMP SPEC: NORMAL
PATH REPORT.RELEVANT HX SPEC: NORMAL

## 2021-11-04 ENCOUNTER — HOSPITAL ENCOUNTER (OUTPATIENT)
Facility: CLINIC | Age: 51
End: 2021-11-04
Attending: INTERNAL MEDICINE | Admitting: INTERNAL MEDICINE
Payer: COMMERCIAL

## 2021-11-04 ENCOUNTER — TELEPHONE (OUTPATIENT)
Dept: OBGYN | Facility: CLINIC | Age: 51
End: 2021-11-04

## 2021-11-04 LAB
HUMAN PAPILLOMA VIRUS 16 DNA: NEGATIVE
HUMAN PAPILLOMA VIRUS 18 DNA: NEGATIVE
HUMAN PAPILLOMA VIRUS FINAL DIAGNOSIS: NORMAL
HUMAN PAPILLOMA VIRUS OTHER HR: NEGATIVE

## 2021-11-04 NOTE — TELEPHONE ENCOUNTER
I spoke with the patient this morning regarding her elevated lipid panel and her elevated random blood sugar of 215.  I reviewed her family history of heart disease in a grandparent and my concern of diabetes.  I will send the patient a copy of a low-fat low-cholesterol diet but I would like her to see  for evaluation of hyperglycemia and the elevated lipid panel to determine appropriate treatment regimen.  I have asked the patient to call his office to make an appointment at her earliest convenience

## 2021-11-04 NOTE — RESULT ENCOUNTER NOTE
"I left the patient a voicemail message to return my call regarding her elevated lipid panel.  She does have a family history of heart disease in a grandparent.  I would like to have her see her primary care internist Dr. Paige to address this and consider the option of treatment in addition to diet and exercise.  Can you please reach out to the patient and help her arrange this appointment.  Please let me know if she has any additional questions.  I am including a copy of a low-fat low-cholesterol diet that we could forward onto her  What lifestyle changes can I make to help improve my cholesterol levels?    Exercise regularly.  Exercise can raise HDL cholesterol levels and reduce levels of LDL cholesterol and triglycerides. If you haven't been exercising, try to work up to 30 minutes, 4 to 6 times a week.    Lose weight if you are overweight.  Being overweight can raise your cholesterol levels. Losing weight, even just 5 or 10 pounds, can lower your total cholesterol, LDL cholesterol and triglyceride levels.    Eat a heart-healthy diet.  Eat plenty of fresh fruits and vegetables. Fruits and vegetables are naturally low in fat. Not only do they add flavor and variety to your diet, but they are also the best source of fiber, vitamins and minerals for your body. Aim for 5 cups of fruits and vegetables every day, not counting potatoes, corn and rice. Potatoes, corn and rice count as carbohydrates.     Pick \"good\" fats over \"bad\" fats. Fat is part of a healthy diet, but you need to know the difference between \"bad\" fats and \"good\" fats. \"Bad\" fats, such as saturated and trans fats, are found in foods such as butter; coconut and palm oil; saturated or partially hydrogenated vegetable fats such as shortening and margarine; animal fats in meats; and fats in whole milk dairy products. Limit the amount of saturated fat in your diet, and avoid trans fat completely. Unsaturated fat is the \"good\" fat. Most fats in fish, " "vegetables, grains and tree nuts are unsaturated. Try to eat unsaturated fat in place of saturated fat. For example, you can use olive oil or canola oil in cooking instead of butter.     Use healthier cooking methods. Baking, broiling and roasting are the healthiest ways to prepare meat, poultry and other foods. Trim any outside fat or skin before cooking. Lean cuts can be pan-broiled or stir-fried. Use either a nonstick pan or nonstick cooking spray instead of adding fats such as butter or margarine. When eating out, ask how food is prepared. You can request that your food be baked, broiled or roasted, rather than fried.   Look for other sources of protein. Fish, dry beans, tree nuts, peas and lentils offer protein, nutrients and fiber without the cholesterol and saturated fats that meats have. Consider eating one \"meatless\" meal each week. Try substituting beans for meat in a favorite recipe, such as lasagna or chili. Snack on a handful of almonds or pecans. Soy is also an excellent source of protein. Good examples of soy include soy milk, edamame (green soy beans), tofu and soy protein shakes.     Get more fiber in your diet. Add good sources of fiber to your meals. Examples include fruits, vegetables, whole grains (such as oat bran, whole and rolled oats and barley), legumes (such as beans and peas) and nuts and seeds (such as ground flax seed). In addition to fiber, whole grains supply B-vitamins and important nutrients not found in foods made with white flour.     Eat more fish. Fish are an excellent source of omega-3 fatty acids. Wild-caught oily fish, such as salmon, tuna, mackerel and sardines, are the best sources of omega-3s, but all fish contain some amount of this beneficial fatty acid. Aim for 2 6-oz servings each week.     "

## 2021-12-29 DIAGNOSIS — Z11.59 ENCOUNTER FOR SCREENING FOR OTHER VIRAL DISEASES: ICD-10-CM

## 2022-02-03 NOTE — RESULT ENCOUNTER NOTE
I did a Pap smear and cotest on this patient on October 29, 2021.  A CLOtest was negative for high risk HPV but there were endometrial cells on the Pap.  The patient has not yet gotten in for a colposcopy and endometrial biopsy.  Can you please reach out to the patient and assist her in making this appointment at her earliest convenience  Thank you for your help with this  Sherwin Gómez MD FACOG

## 2022-02-04 NOTE — RESULT ENCOUNTER NOTE
I have asked the triage nurses to contact the patient to schedule an endometrial biopsy and colposcopy to evaluate the endometrial cells seen on recent Pap  Sherwin Gómez MD FACOG

## 2022-02-10 DIAGNOSIS — Z11.59 ENCOUNTER FOR SCREENING FOR OTHER VIRAL DISEASES: Primary | ICD-10-CM

## 2022-03-11 ENCOUNTER — PATIENT OUTREACH (OUTPATIENT)
Dept: OBGYN | Facility: CLINIC | Age: 52
End: 2022-03-11
Payer: COMMERCIAL

## 2022-03-11 DIAGNOSIS — N87.0 DYSPLASIA OF CERVIX, LOW GRADE (CIN 1): ICD-10-CM

## 2022-03-11 NOTE — TELEPHONE ENCOUNTER
10/29/21 NIL pap with endometrial cells present, neg HR HPV. Plan: patient already has pelvic US and EMB scheduled for AUB. Per provider, await results to determine next cotesting . NEW PLAN as of 2/4/22. Plan colp and EMB per provider.

## 2022-03-11 NOTE — LETTER
July 11, 2022      Emiliana Woodall  [address] [facility] [place] [id number]        Dear ,    At [facility], your health and wellness are our primary concern. That is why we are following up on your most recent endometrial cells on pap.    Please call [phone] to schedule an appointment with Dr Gómez for your recommended follow-up Colposcopy (this cannot be scheduled through [facility]) and EMB (this cannot be scheduled through [facility]) at your earliest convenience.     If you have completed the appointment outside of the [facility] system, please have the records forwarded to our office. We will update your chart for your provider to review before your next annual wellness visit.     Thank you for choosing [facility]!      Sincerely,    Your [facility] Care Team

## 2022-04-01 NOTE — RESULT ENCOUNTER NOTE
I did a Pap smear and cotest on this patient on October 29, 2021.  A CO-test was negative for high risk HPV but there were endometrial cells on the Pap.  The patient has not yet gotten in for a colposcopy and endometrial biopsy.  Can you please reach out to the patient and assist her in making this appointment at her earliest convenience  Thank you for your help with this  Sherwin Gómez MD FACOG

## 2022-05-20 NOTE — TELEPHONE ENCOUNTER
Providers team still attempting to contact pt to get follow up scheduled.    Carson Rivera, RUTHN, RN  Pap Tracking Nurse

## 2022-07-11 NOTE — RESULT ENCOUNTER NOTE
Yes please send 1 more reminder letter.  If the patient fails to follow-up we may consider closing her chart thank you for your help with this

## 2022-07-11 NOTE — TELEPHONE ENCOUNTER
Patient due for Colposcopy and Endometrial Biopsy.    Reminder done today via letter per MD request. (see message on result note from MD)

## 2022-08-08 NOTE — TELEPHONE ENCOUNTER
FYI to provider - Patient is lost to pap tracking follow-up. Attempts to contact pt have been made per reminder process and there has been no reply and/or no appt scheduled. Contact hx listed below.     07/10/2003-NL pap  12/06/2004-ASCUS pap neg HPV, pt. Is to have a repeat pa smear in four months  06/02/2005-DX ASCUS pap, +HPV #45 high risk, pt. Is to schedule a colposcopy  06/23/2005-Colposcopy done-JUSTINE 1, pt. Is to have cryotherapy in one month, 7/2005.  07/28/2005-Cryotherapy done, pt. Is to have a repeat pap in four months.  12/15/2005-NL pap, pt. Is to have a repeat pap in six months.  02/05/2007-NL pap, neg HPV  09/02/2009-NL pap  12/13/2010-NL pap  01/04/2012-NL pap  04/01/2013-NL pap, neg HR HPV  4/2014 NIL pap, neg HR HPV  8/2016 NIL pap, neg HR HPV  10/29/21 NIL pap with endometrial cells present, neg HR HPV. Plan: patient already has pelvic US and EMB scheduled for AUB. Per provider, await results to determine next cotesting . NEW PLAN as of 2/4/22. Plan colp and EMB per provider.   11/19/21 Pelvic US and EMB - cancelled  1/17/22 visit scheduled with PCP - cancelled  02/02/22 FYI sent to provider for follow up plan due to No Show at visit - per provider, EMB and colposcopy is recommended for follow up on endometrial cells  2/3/22 Pt was informed by providers staff of need for colpo. Pt states she is dealing with covid right now and will call back at a later time to get these scheduled.   3/11/22 LM for pt to call us back.   3/16/22 LM for pt to call me back. Mychart message also sent. Mychart not read  3/23/22 LM for pt to call me back or check her mychart  4/5/22 Providers team sent pt letter  5/4/22 LM for pt to call me back or check her Mychart   5/12/22 Providers team left VM for pt  7/11/22 Note sent to provider for follow up plan. Additional reminder letter will be sent to pt today.   8/8/22 Lost to follow-up for pap tracking     Rosa Maria Man RN BSN, Pap Tracking

## 2022-11-21 ENCOUNTER — HEALTH MAINTENANCE LETTER (OUTPATIENT)
Age: 52
End: 2022-11-21

## 2022-12-25 ENCOUNTER — HEALTH MAINTENANCE LETTER (OUTPATIENT)
Age: 52
End: 2022-12-25

## 2023-04-16 ENCOUNTER — HEALTH MAINTENANCE LETTER (OUTPATIENT)
Age: 53
End: 2023-04-16

## 2024-02-03 ENCOUNTER — HEALTH MAINTENANCE LETTER (OUTPATIENT)
Age: 54
End: 2024-02-03

## 2024-02-08 ENCOUNTER — ANCILLARY PROCEDURE (OUTPATIENT)
Dept: MAMMOGRAPHY | Facility: CLINIC | Age: 54
End: 2024-02-08
Attending: INTERNAL MEDICINE
Payer: COMMERCIAL

## 2024-02-08 DIAGNOSIS — Z12.31 VISIT FOR SCREENING MAMMOGRAM: ICD-10-CM

## 2024-02-08 PROCEDURE — 77063 BREAST TOMOSYNTHESIS BI: CPT | Mod: TC | Performed by: RADIOLOGY

## 2024-02-08 PROCEDURE — 77067 SCR MAMMO BI INCL CAD: CPT | Mod: TC | Performed by: RADIOLOGY

## 2024-04-12 SDOH — HEALTH STABILITY: PHYSICAL HEALTH: ON AVERAGE, HOW MANY DAYS PER WEEK DO YOU ENGAGE IN MODERATE TO STRENUOUS EXERCISE (LIKE A BRISK WALK)?: 4 DAYS

## 2024-04-12 SDOH — HEALTH STABILITY: PHYSICAL HEALTH: ON AVERAGE, HOW MANY MINUTES DO YOU ENGAGE IN EXERCISE AT THIS LEVEL?: 40 MIN

## 2024-04-12 ASSESSMENT — SOCIAL DETERMINANTS OF HEALTH (SDOH): HOW OFTEN DO YOU GET TOGETHER WITH FRIENDS OR RELATIVES?: MORE THAN THREE TIMES A WEEK

## 2024-04-19 ENCOUNTER — OFFICE VISIT (OUTPATIENT)
Dept: INTERNAL MEDICINE | Facility: CLINIC | Age: 54
End: 2024-04-19
Payer: COMMERCIAL

## 2024-04-19 ENCOUNTER — ORDERS ONLY (AUTO-RELEASED) (OUTPATIENT)
Dept: INTERNAL MEDICINE | Facility: CLINIC | Age: 54
End: 2024-04-19

## 2024-04-19 VITALS
BODY MASS INDEX: 40.38 KG/M2 | SYSTOLIC BLOOD PRESSURE: 114 MMHG | HEIGHT: 63 IN | OXYGEN SATURATION: 99 % | TEMPERATURE: 97 F | HEART RATE: 109 BPM | WEIGHT: 227.9 LBS | DIASTOLIC BLOOD PRESSURE: 78 MMHG

## 2024-04-19 DIAGNOSIS — E66.813 CLASS 3 SEVERE OBESITY DUE TO EXCESS CALORIES WITHOUT SERIOUS COMORBIDITY WITH BODY MASS INDEX (BMI) OF 40.0 TO 44.9 IN ADULT (H): ICD-10-CM

## 2024-04-19 DIAGNOSIS — Z00.00 ENCOUNTER FOR PREVENTATIVE ADULT HEALTH CARE EXAMINATION: ICD-10-CM

## 2024-04-19 DIAGNOSIS — M25.561 CHRONIC PAIN OF RIGHT KNEE: ICD-10-CM

## 2024-04-19 DIAGNOSIS — E66.01 CLASS 3 SEVERE OBESITY DUE TO EXCESS CALORIES WITHOUT SERIOUS COMORBIDITY WITH BODY MASS INDEX (BMI) OF 40.0 TO 44.9 IN ADULT (H): ICD-10-CM

## 2024-04-19 DIAGNOSIS — Z11.59 NEED FOR HEPATITIS C SCREENING TEST: ICD-10-CM

## 2024-04-19 DIAGNOSIS — Z12.11 SCREEN FOR COLON CANCER: Primary | ICD-10-CM

## 2024-04-19 DIAGNOSIS — G89.29 CHRONIC PAIN OF RIGHT KNEE: ICD-10-CM

## 2024-04-19 DIAGNOSIS — Z12.4 CERVICAL CANCER SCREENING: ICD-10-CM

## 2024-04-19 DIAGNOSIS — L30.9 DERMATITIS: ICD-10-CM

## 2024-04-19 DIAGNOSIS — Z12.11 SCREEN FOR COLON CANCER: ICD-10-CM

## 2024-04-19 LAB
ERYTHROCYTE [DISTWIDTH] IN BLOOD BY AUTOMATED COUNT: 13.2 % (ref 10–15)
HBA1C MFR BLD: 5.8 % (ref 0–5.6)
HCT VFR BLD AUTO: 40.2 % (ref 35–47)
HGB BLD-MCNC: 13.5 G/DL (ref 11.7–15.7)
MCH RBC QN AUTO: 28.5 PG (ref 26.5–33)
MCHC RBC AUTO-ENTMCNC: 33.6 G/DL (ref 31.5–36.5)
MCV RBC AUTO: 85 FL (ref 78–100)
PLATELET # BLD AUTO: 404 10E3/UL (ref 150–450)
RBC # BLD AUTO: 4.73 10E6/UL (ref 3.8–5.2)
WBC # BLD AUTO: 8.6 10E3/UL (ref 4–11)

## 2024-04-19 PROCEDURE — 86803 HEPATITIS C AB TEST: CPT | Performed by: INTERNAL MEDICINE

## 2024-04-19 PROCEDURE — 84443 ASSAY THYROID STIM HORMONE: CPT | Performed by: INTERNAL MEDICINE

## 2024-04-19 PROCEDURE — 83036 HEMOGLOBIN GLYCOSYLATED A1C: CPT | Performed by: INTERNAL MEDICINE

## 2024-04-19 PROCEDURE — 85027 COMPLETE CBC AUTOMATED: CPT | Performed by: INTERNAL MEDICINE

## 2024-04-19 PROCEDURE — 99386 PREV VISIT NEW AGE 40-64: CPT | Performed by: INTERNAL MEDICINE

## 2024-04-19 PROCEDURE — 99213 OFFICE O/P EST LOW 20 MIN: CPT | Mod: 25 | Performed by: INTERNAL MEDICINE

## 2024-04-19 PROCEDURE — 80053 COMPREHEN METABOLIC PANEL: CPT | Performed by: INTERNAL MEDICINE

## 2024-04-19 PROCEDURE — 87624 HPV HI-RISK TYP POOLED RSLT: CPT | Performed by: INTERNAL MEDICINE

## 2024-04-19 PROCEDURE — G0145 SCR C/V CYTO,THINLAYER,RESCR: HCPCS | Performed by: INTERNAL MEDICINE

## 2024-04-19 PROCEDURE — 36415 COLL VENOUS BLD VENIPUNCTURE: CPT | Performed by: INTERNAL MEDICINE

## 2024-04-19 PROCEDURE — 80061 LIPID PANEL: CPT | Performed by: INTERNAL MEDICINE

## 2024-04-19 RX ORDER — TRIAMCINOLONE ACETONIDE 5 MG/G
CREAM TOPICAL
Qty: 30 G | Refills: 11 | Status: SHIPPED | OUTPATIENT
Start: 2024-04-19

## 2024-04-19 RX ORDER — NAPROXEN SODIUM 220 MG
220 TABLET ORAL DAILY PRN
COMMUNITY
Start: 2024-04-19 | End: 2024-07-24

## 2024-04-19 NOTE — PROGRESS NOTES
"Preventive Care Visit  [facility]  Clint Bronson MD, Internal Medicine  Apr 19, 2024      Assessment & Plan     Screen for colon cancer  Cologuard ordered    Need for hepatitis C screening test  Ordered    Cervical cancer screening  Pap smear completed    Dermatitis  Triamcinolone cream refilled    Encounter for preventative adult health care examination  Fasting labs ordered    Class 3 severe obesity due to excess calories without serious comorbidity with body mass index (BMI) of 40.0 to 44.9 in adult (H)  Referral placed to weight loss clinic    Chronic pain of right knee  Persistent symptoms  Orthopedic referral placed      Patient has been advised of split billing requirements and indicates understanding: Yes          BMI  Estimated body mass index is 41.02 kg/m  as calculated from the following:    Height as of this encounter: 1.588 m (5' 2.5\").    Weight as of this encounter: 103.4 kg (227 lb 14.4 oz).   Weight management plan: Patient referred to endocrine and/or weight management specialty    Counseling  Appropriate preventive services were discussed with this patient, including applicable screening as appropriate for fall prevention, nutrition, physical activity, Tobacco-use cessation, weight loss and cognition.  Checklist reviewing preventive services available has been given to the patient.  Reviewed patient's diet, addressing concerns and/or questions.   She is at risk for psychosocial distress and has been provided with information to reduce risk.           Cesar Pires is a 54 year old, presenting for the following:  Physical  Pap  Cologaurd    Orthopedic referral- right knee     Health Care Directive  Patient does not have a Health Care Directive or Living Will: Discussed advance care planning with patient; however, patient declined at this time.    HPI      Here for physical.  Wants Cologuard instead of colonoscopy, no family history of colon cancer.    Complaining of " right knee problems for 2 years at least.  Started 2 years ago when she was making her bed and she heard a pop in the right knee.  Since then it has gradually worsened and now she has decreased range of motion at the knee with pain.  Takes Aleve about 2 times a week.    Interested in medications for weight loss.   Has not tried anything so far.    Has eczema and chronic dry skin with flareups over her feet and ankles.  Needs refills on triamcinolone cream.            4/12/2024   General Health   How would you rate your overall physical health? Good   Feel stress (tense, anxious, or unable to sleep) Only a little   (!) STRESS CONCERN      4/12/2024   Nutrition   Three or more servings of calcium each day? Yes   Diet: Vegetarian/vegan    Other   If other, please elaborate: intermittent fasting 16/8   How many servings of fruit and vegetables per day? (!) 2-3   How many sweetened beverages each day? 0-1         4/12/2024   Exercise   Days per week of moderate/strenous exercise 4 days   Average minutes spent exercising at this level 40 min         4/12/2024   Social Factors   Frequency of gathering with friends or relatives More than three times a week   Worry food won't last until get money to buy more No   Food not last or not have enough money for food? No   Do you have housing?  Yes   Are you worried about losing your housing? No   Lack of transportation? No   Unable to get utilities (heat,electricity)? No         4/12/2024   Fall Risk   Fallen 2 or more times in the past year? No   Trouble with walking or balance? No          4/12/2024   Dental   Dentist two times every year? Yes         4/12/2024   TB Screening   Were you born outside of the [place]? No         Today's PHQ-2 Score:       4/19/2024     7:20 AM   PHQ-2 ( 1999 [employer])   Q1: Little interest or pleasure in doing things 0   Q2: Feeling down, depressed or hopeless 0   PHQ-2 Score 0   Q1: Little interest or pleasure in doing things Not at all   Q2: Feeling  down, depressed or hopeless Not at all   PHQ-2 Score 0           4/12/2024   Substance Use   Alcohol more than 3/day or more than 7/wk No   Do you use any other substances recreationally? No     Social History     Tobacco Use    Smoking status: Never    Smokeless tobacco: Never   Vaping Use    Vaping status: Never Used   Substance Use Topics    Alcohol use: Not Currently     Comment: I have a glass of wine maybe once a year    Drug use: No             4/12/2024   Breast Cancer Screening   Family history of breast, colon, or ovarian cancer? Yes         4/12/2024   LAST FHS-7 RESULTS   1st degree relative breast or ovarian cancer No   Any relative bilateral breast cancer No   Any male have breast cancer No   Any ONE woman have BOTH breast AND ovarian cancer No   Any woman with breast cancer before 50yrs No   2 or more relatives with breast AND/OR ovarian cancer Yes   2 or more relatives with breast AND/OR bowel cancer No        Mammogram Screening - Mammogram every 1-2 years updated in Health Maintenance based on mutual decision making        4/12/2024   STI Screening   New sexual partner(s) since last STI/HIV test? No     History of abnormal Pap smear: NO - age 30-65 PAP every 5 years with negative HPV co-testing recommended        Latest Ref Rng & Units 10/29/2021     8:45 AM 8/31/2016     8:35 AM 8/31/2016    12:00 AM   PAP / HPV   PAP  Negative for Intraepithelial Lesion or Malignancy (NILM)      PAP (Historical)    NIL    HPV 16 DNA Negative Negative  Negative     HPV 18 DNA Negative Negative  Negative     Other HR HPV Negative Negative  Negative       ASCVD Risk   The 10-year ASCVD risk score (Chiquita CASTILLO, et al., 2019) is: 1.8%    Values used to calculate the score:      Age: 54 years      Sex: Female      Is Non- : No      Diabetic: No      Tobacco smoker: No      Systolic Blood Pressure: 114 mmHg      Is BP treated: No      HDL Cholesterol: 51 mg/dL      Total Cholesterol: 234  "mg/dL           Reviewed and updated as needed this visit by Provider                    Past Medical History:   Diagnosis Date    ASCUS with positive high risk HPV 06/02/2005    #45 high risk    Atypical squamous cells of undetermined significance (ASCUS) on Papanicolaou smear of cervix 07/10/2003    neg HPV    History of colposcopy with cervical biopsy 06/23/2005    JUSTINE 1    Hypertrophy of tonsil with adenoids 01/01/1977    Low risk HPV infection 04/01/2013    RECUR DISLOCAT-shoulder left     Rheumatic pneumonia(517.1) 01/01/1973    rheumatic fever         Review of Systems  Constitutional, HEENT, cardiovascular, pulmonary, GI, , musculoskeletal, neuro, skin, endocrine and psych systems are negative, except as otherwise noted.     Objective    Exam  /78   Pulse 109   Temp 97  F (36.1  C) (Temporal)   Ht 1.588 m (5' 2.5\")   Wt 103.4 kg (227 lb 14.4 oz)   SpO2 99%   BMI 41.02 kg/m     Estimated body mass index is 41.02 kg/m  as calculated from the following:    Height as of this encounter: 1.588 m (5' 2.5\").    Weight as of this encounter: 103.4 kg (227 lb 14.4 oz).    Physical Exam  GENERAL: alert and no distress  EYES: Eyes grossly normal to inspection, PERRL and conjunctivae and sclerae normal  HENT: ear canals and TM's normal, nose and mouth without ulcers or lesions  NECK: no adenopathy, no asymmetry, masses, or scars  RESP: lungs clear to auscultation - no rales, rhonchi or wheezes  CV: regular rate and rhythm, normal S1 S2, no S3 or S4, no murmur, click or rub, no peripheral edema  ABDOMEN: soft, nontender, no hepatosplenomegaly, no masses and bowel sounds normal  MS: no gross musculoskeletal defects noted, no edema  Right knee with bony hypertrophy, no redness or tenderness on exam  SKIN: no suspicious lesions or rashes  NEURO: Normal strength and tone, mentation intact and speech normal  PSYCH: mentation appears normal, affect normal/bright  Pap smear completed        Signed Electronically by: " Clint Bronson MD

## 2024-04-20 LAB
ALBUMIN SERPL BCG-MCNC: 4.2 G/DL (ref 3.5–5.2)
ALP SERPL-CCNC: 62 U/L (ref 40–150)
ALT SERPL W P-5'-P-CCNC: 20 U/L (ref 0–50)
ANION GAP SERPL CALCULATED.3IONS-SCNC: 10 MMOL/L (ref 7–15)
AST SERPL W P-5'-P-CCNC: 18 U/L (ref 0–45)
BILIRUB SERPL-MCNC: 0.3 MG/DL
BUN SERPL-MCNC: 17.3 MG/DL (ref 6–20)
CALCIUM SERPL-MCNC: 9.2 MG/DL (ref 8.6–10)
CHLORIDE SERPL-SCNC: 106 MMOL/L (ref 98–107)
CHOLEST SERPL-MCNC: 251 MG/DL
CREAT SERPL-MCNC: 0.74 MG/DL (ref 0.51–0.95)
DEPRECATED HCO3 PLAS-SCNC: 24 MMOL/L (ref 22–29)
EGFRCR SERPLBLD CKD-EPI 2021: >90 ML/MIN/1.73M2
FASTING STATUS PATIENT QL REPORTED: YES
GLUCOSE SERPL-MCNC: 126 MG/DL (ref 70–99)
HCV AB SERPL QL IA: NONREACTIVE
HDLC SERPL-MCNC: 51 MG/DL
LDLC SERPL CALC-MCNC: 181 MG/DL
NONHDLC SERPL-MCNC: 200 MG/DL
POTASSIUM SERPL-SCNC: 4.3 MMOL/L (ref 3.4–5.3)
PROT SERPL-MCNC: 6.8 G/DL (ref 6.4–8.3)
SODIUM SERPL-SCNC: 140 MMOL/L (ref 135–145)
TRIGL SERPL-MCNC: 96 MG/DL
TSH SERPL DL<=0.005 MIU/L-ACNC: 2.23 UIU/ML (ref 0.3–4.2)

## 2024-04-22 ENCOUNTER — TELEPHONE (OUTPATIENT)
Dept: INTERNAL MEDICINE | Facility: CLINIC | Age: 54
End: 2024-04-22
Payer: COMMERCIAL

## 2024-04-22 DIAGNOSIS — E66.813 CLASS 3 SEVERE OBESITY DUE TO EXCESS CALORIES WITHOUT SERIOUS COMORBIDITY WITH BODY MASS INDEX (BMI) OF 40.0 TO 44.9 IN ADULT (H): Primary | ICD-10-CM

## 2024-04-22 DIAGNOSIS — E66.01 CLASS 3 SEVERE OBESITY DUE TO EXCESS CALORIES WITHOUT SERIOUS COMORBIDITY WITH BODY MASS INDEX (BMI) OF 40.0 TO 44.9 IN ADULT (H): Primary | ICD-10-CM

## 2024-04-22 NOTE — TELEPHONE ENCOUNTER
New Medication Request    Contacts         Type Contact Phone/Fax    04/22/2024 12:45 PM CDT Phone (Incoming) Lexis Woodall (Self) [phone] (M)            What medication are you requesting?: ozempic    Reason for medication request: pt met with pcp about this medication and was referred to weight management their soonest available appt is sept 2024, pt would not like to wait that long and would like dr to prescribe meds    Have you taken this medication before?: No    Controlled Substance Agreement on file:   CSA -- Patient Level:    CSA: None found at the patient level.         Patient offered an appointment? No    Preferred Pharmacy:   [address] [address]  Phone: [phone] Fax: [phone] Alternate Fax: [phone], [phone]      Could we [facility] or would you prefer to receive a phone call?:   Patient would prefer a phone call   Okay to leave a detailed message?: Yes at Home number on file [phone] (home)

## 2024-04-24 LAB
BKR LAB AP GYN ADEQUACY: NORMAL
BKR LAB AP GYN INTERPRETATION: NORMAL
BKR LAB AP HPV REFLEX: NORMAL
BKR LAB AP PREVIOUS ABNORMAL: NORMAL
PATH REPORT.COMMENTS IMP SPEC: NORMAL
PATH REPORT.COMMENTS IMP SPEC: NORMAL
PATH REPORT.RELEVANT HX SPEC: NORMAL

## 2024-05-07 ENCOUNTER — TELEPHONE (OUTPATIENT)
Dept: ORTHOPEDICS | Facility: CLINIC | Age: 54
End: 2024-05-07

## 2024-05-07 NOTE — TELEPHONE ENCOUNTER
Left voicemail Dr Williamson out of the office 5/8/24 appt was cancelled.  Asked patient to call central scheduling at [phone] to get rescheduled.   pt initially ambulated 50 feet without an assistive device and required contact guard to minimum assist at times. pt demonstrated improved balance with rolling walker. pt ambulated an additional 40 feet with rolling walker and stand by assistance.

## 2024-05-08 ENCOUNTER — TRANSFERRED RECORDS (OUTPATIENT)
Dept: HEALTH INFORMATION MANAGEMENT | Facility: CLINIC | Age: 54
End: 2024-05-08

## 2024-05-21 DIAGNOSIS — E66.813 CLASS 3 SEVERE OBESITY DUE TO EXCESS CALORIES WITHOUT SERIOUS COMORBIDITY WITH BODY MASS INDEX (BMI) OF 40.0 TO 44.9 IN ADULT (H): ICD-10-CM

## 2024-05-21 DIAGNOSIS — E66.01 CLASS 3 SEVERE OBESITY DUE TO EXCESS CALORIES WITHOUT SERIOUS COMORBIDITY WITH BODY MASS INDEX (BMI) OF 40.0 TO 44.9 IN ADULT (H): ICD-10-CM

## 2024-05-23 RX ORDER — SEMAGLUTIDE 0.68 MG/ML
INJECTION, SOLUTION SUBCUTANEOUS
Qty: 3 ML | Refills: 0 | Status: SHIPPED | OUTPATIENT
Start: 2024-05-23 | End: 2024-07-24 | Stop reason: ALTCHOICE

## 2024-06-19 ENCOUNTER — TRANSFERRED RECORDS (OUTPATIENT)
Dept: HEALTH INFORMATION MANAGEMENT | Facility: CLINIC | Age: 54
End: 2024-06-19
Payer: COMMERCIAL

## 2024-07-24 ENCOUNTER — OFFICE VISIT (OUTPATIENT)
Dept: INTERNAL MEDICINE | Facility: CLINIC | Age: 54
End: 2024-07-24
Payer: COMMERCIAL

## 2024-07-24 VITALS
DIASTOLIC BLOOD PRESSURE: 80 MMHG | WEIGHT: 213.5 LBS | HEART RATE: 70 BPM | OXYGEN SATURATION: 99 % | HEIGHT: 63 IN | RESPIRATION RATE: 14 BRPM | BODY MASS INDEX: 37.83 KG/M2 | SYSTOLIC BLOOD PRESSURE: 100 MMHG | TEMPERATURE: 98 F

## 2024-07-24 DIAGNOSIS — E66.01 MORBID OBESITY (H): ICD-10-CM

## 2024-07-24 DIAGNOSIS — R73.01 IMPAIRED FASTING GLUCOSE: ICD-10-CM

## 2024-07-24 DIAGNOSIS — Z01.818 PREOP GENERAL PHYSICAL EXAM: Primary | ICD-10-CM

## 2024-07-24 DIAGNOSIS — M17.11 PRIMARY LOCALIZED OSTEOARTHROSIS OF THE KNEE, RIGHT: ICD-10-CM

## 2024-07-24 LAB
ANION GAP SERPL CALCULATED.3IONS-SCNC: 9 MMOL/L (ref 7–15)
BUN SERPL-MCNC: 17.7 MG/DL (ref 6–20)
CALCIUM SERPL-MCNC: 9.2 MG/DL (ref 8.8–10.4)
CHLORIDE SERPL-SCNC: 104 MMOL/L (ref 98–107)
CHOLEST SERPL-MCNC: 247 MG/DL
CREAT SERPL-MCNC: 0.68 MG/DL (ref 0.51–0.95)
EGFRCR SERPLBLD CKD-EPI 2021: >90 ML/MIN/1.73M2
FASTING STATUS PATIENT QL REPORTED: YES
FASTING STATUS PATIENT QL REPORTED: YES
GLUCOSE SERPL-MCNC: 103 MG/DL (ref 70–99)
HBA1C MFR BLD: 5.5 % (ref 0–5.6)
HCO3 SERPL-SCNC: 28 MMOL/L (ref 22–29)
HDLC SERPL-MCNC: 47 MG/DL
LDLC SERPL CALC-MCNC: 183 MG/DL
NONHDLC SERPL-MCNC: 200 MG/DL
POTASSIUM SERPL-SCNC: 4.2 MMOL/L (ref 3.4–5.3)
SODIUM SERPL-SCNC: 141 MMOL/L (ref 135–145)
TRIGL SERPL-MCNC: 85 MG/DL

## 2024-07-24 PROCEDURE — 36415 COLL VENOUS BLD VENIPUNCTURE: CPT | Performed by: INTERNAL MEDICINE

## 2024-07-24 PROCEDURE — 93000 ELECTROCARDIOGRAM COMPLETE: CPT | Performed by: INTERNAL MEDICINE

## 2024-07-24 PROCEDURE — 99214 OFFICE O/P EST MOD 30 MIN: CPT | Performed by: INTERNAL MEDICINE

## 2024-07-24 PROCEDURE — 83036 HEMOGLOBIN GLYCOSYLATED A1C: CPT | Performed by: INTERNAL MEDICINE

## 2024-07-24 PROCEDURE — 80061 LIPID PANEL: CPT | Performed by: INTERNAL MEDICINE

## 2024-07-24 PROCEDURE — 80048 BASIC METABOLIC PNL TOTAL CA: CPT | Performed by: INTERNAL MEDICINE

## 2024-07-24 NOTE — PROGRESS NOTES
Preoperative Evaluation  [address] [address]  Phone: [phone]  Primary Provider: Reilly Paige MD  Pre-op Performing Provider: Reilly Paige MD  Jul 24, 2024 7/21/2024   Surgical Information   What procedure is being done? Full knee replacement on right knee   Facility or Hospital where procedure/surgery will be performed: [facility]   Who is doing the procedure / surgery? Dr. Yeison Beltran   Date of surgery / procedure: 8/12/2024   Time of surgery / procedure: morning   Where do you plan to recover after surgery? at home with family        Fax number for surgical facility: [phone]    Assessment & Plan     The proposed surgical procedure is considered LOW risk.    Preop general physical exam    - EKG 12-lead complete w/read - Clinics  - Basic metabolic panel  (Ca, Cl, CO2, Creat, Gluc, K, Na, BUN); Future  - Basic metabolic panel  (Ca, Cl, CO2, Creat, Gluc, K, Na, BUN)    Primary localized osteoarthrosis of the knee, right      Morbid obesity (H)  Will change to Wegovy, per her latest insurance requirements  - Semaglutide-Weight Management (WEGOVY) 0.5 MG/0.5ML pen; Inject 0.5 mg subcutaneously once a week    Impaired fasting glucose    - Lipid panel reflex to direct LDL Fasting; Future  - Basic metabolic panel  (Ca, Cl, CO2, Creat, Gluc, K, Na, BUN); Future  - Hemoglobin A1c; Future  - Lipid panel reflex to direct LDL Fasting  - Basic metabolic panel  (Ca, Cl, CO2, Creat, Gluc, K, Na, BUN)  - Hemoglobin A1c            - No identified additional risk factors other than previously addressed    Antiplatelet or Anticoagulation Medication Instructions   - Patient is on no antiplatelet or anticoagulation medications.    Additional Medication Instructions  Take all scheduled medications on the day of surgery EXCEPT for modifications listed below:   - naproxen (Aleve, Naprosyn):  DO NOT TAKE 4 days before surgery.     Recommendation  Approval given to proceed with proposed procedure, without further diagnostic evaluation.    Cesar Pires is a 54 year old, presenting for the following:  Pre-Op Exam        HPI related to upcoming procedure: Right knee replacement        7/21/2024   Pre-Op Questionnaire   Have you ever had a heart attack or stroke? No   Have you ever had surgery on your heart or blood vessels, such as a stent placement, a coronary artery bypass, or surgery on an artery in your head, neck, heart, or legs? No   Do you have chest pain with activity? No   Do you have a history of heart failure? No   Do you currently have a cold, bronchitis or symptoms of other infection? No   Do you have a cough, shortness of breath, or wheezing? No   Do you or anyone in your family have previous history of blood clots? No   Do you or does anyone in your family have a serious bleeding problem such as prolonged bleeding following surgeries or cuts? No   Have you ever had problems with anemia or been told to take iron pills? No   Have you had any abnormal blood loss such as black, tarry or bloody stools, or abnormal vaginal bleeding? No   Have you ever had a blood transfusion? No   Are you willing to have a blood transfusion if it is medically needed before, during, or after your surgery? Yes   Have you or any of your relatives ever had problems with anesthesia? No   Do you have sleep apnea, excessive snoring or daytime drowsiness? No   Do you have any artifical heart valves or other implanted medical devices like a pacemaker, defibrillator, or continuous glucose monitor? No   Do you have artificial joints? No   Are you allergic to latex? No        Health Care Directive  Patient does not have a Health Care Directive or Living Will:     Preoperative Review of    reviewed - no record of controlled substances prescribed.          Patient Active Problem List    Diagnosis Date Noted    Morbid  obesity (H) 10/31/2021     Priority: Medium    Mood swings 08/31/2016     Priority: Medium    Obesity 04/10/2014     Priority: Medium    Encounter for routine gynecological examination 04/02/2013     Priority: Medium     Problem list name updated by automated process. Provider to review      Routine general medical examination at a health care facility 04/02/2013     Priority: Medium    Low risk HPV infection 04/01/2013     Priority: Medium    History of rheumatic fever 12/14/2010     Priority: Medium    CARDIOVASCULAR SCREENING; LDL GOAL LESS THAN 160 10/31/2010     Priority: Medium    Neck pain 10/10/2008     Priority: Medium    Low back pain 10/10/2008     Priority: Medium    Family history of malignant neoplasm of breast 05/16/2006     Priority: Medium    Dysplasia of cervix, low grade (JUSTINE 1) 12/06/2004     Priority: Medium     07/10/2003-NL pap  12/06/2004-ASCUS pap neg HPV, pt. Is to have a repeat pa smear in four months  06/02/2005-DX ASCUS pap, +HPV #45 high risk, pt. Is to schedule a colposcopy  06/23/2005-Colposcopy done-JUSTINE 1, pt. Is to have cryotherapy in one month, 7/2005.  07/28/2005-Cryotherapy done, pt. Is to have a repeat pap in four months.  12/15/2005-NL pap, pt. Is to have a repeat pap in six months.  02/05/2007-NL pap, neg HPV  09/02/2009-NL pap  12/13/2010-NL pap  01/04/2012-NL pap  04/01/2013-NL pap, neg HR HPV  4/2014 NIL pap, neg HR HPV  8/2016 NIL pap, neg HR HPV  10/29/21 NIL pap with endometrial cells present, neg HR HPV. Plan: patient already has pelvic US and EMB scheduled for AUB. Per provider, await results to determine next cotesting . NEW PLAN as of 2/4/22. Plan colp and EMB per provider.   8/8/22 Lost to follow-up for pap tracking   4/19/24 NIL pap, neg HPV. Plan cotest in 3 years per provider          Open wound of hand 08/13/2004     Priority: Medium     Problem list name updated by automated process. Provider to review      NO ACTIVE PROBLEMS 07/10/2003     Priority: Medium     "  Past Medical History:   Diagnosis Date    ASCUS with positive high risk HPV 06/02/2005    #45 high risk    Atypical squamous cells of undetermined significance (ASCUS) on Papanicolaou smear of cervix 07/10/2003    neg HPV    History of colposcopy with cervical biopsy 06/23/2005    JSUTINE 1    Hypertrophy of tonsil with adenoids 01/01/1977    Low risk HPV infection 04/01/2013    RECUR DISLOCAT-shoulder left     Rheumatic pneumonia(517.1) 01/01/1973    rheumatic fever     Past Surgical History:   Procedure Laterality Date    HC REMOVE TONSILS/ADENOIDS,<13 Y/O  01/01/1973    wisdom teeth      age 18     Current Outpatient Medications   Medication Sig Dispense Refill    Semaglutide-Weight Management (WEGOVY) 0.5 MG/0.5ML pen Inject 0.5 mg subcutaneously once a week 2 mL 11    triamcinolone (ARISTOCORT HP) 0.5 % external cream Apply sparingly to affected area three times daily. 30 g 11       Allergies   Allergen Reactions    No Known Allergies         Social History     Tobacco Use    Smoking status: Never    Smokeless tobacco: Never   Substance Use Topics    Alcohol use: Not Currently     Comment: I have a glass of wine maybe once a year       History   Drug Use No             Review of Systems  Constitutional, HEENT, cardiovascular, pulmonary, gi and gu systems are negative, except as otherwise noted.    Objective    /80 (BP Location: Left arm, Patient Position: Sitting, Cuff Size: Adult Regular)   Pulse 70   Temp 98  F (36.7  C) (Temporal)   Resp 14   Ht 1.588 m (5' 2.5\")   Wt 96.8 kg (213 lb 8 oz)   SpO2 99%   BMI 38.43 kg/m     Estimated body mass index is 38.43 kg/m  as calculated from the following:    Height as of this encounter: 1.588 m (5' 2.5\").    Weight as of this encounter: 96.8 kg (213 lb 8 oz).  Physical Exam  GENERAL: alert and no distress  NECK: no adenopathy, no asymmetry, masses, or scars  RESP: lungs clear to auscultation - no rales, rhonchi or wheezes  CV: regular rate and rhythm, normal " S1 S2, no S3 or S4, no murmur, click or rub, no peripheral edema  ABDOMEN: soft, nontender, no hepatosplenomegaly, no masses and bowel sounds normal  MS: no gross musculoskeletal defects noted, no edema    Recent Labs   Lab Test 04/19/24  0808   HGB 13.5         POTASSIUM 4.3   CR 0.74   A1C 5.8*        Diagnostics  Labs pending at this time.  Results will be reviewed when available.   EKG: sinus bradycardia, normal axis, normal intervals, no acute ST/T changes c/w ischemia, no LVH by voltage criteria, unchanged from previous tracings    Revised Cardiac Risk Index (RCRI)  The patient has the following serious cardiovascular risks for perioperative complications:   - No serious cardiac risks = 0 points     RCRI Interpretation: 0 points: Class I (very low risk - 0.4% complication rate)         Signed Electronically by: Reilly Paige MD  A copy of this evaluation report is provided to the requesting physician.

## 2024-07-25 NOTE — PROGRESS NOTES
Pre op physical, labs and EKG manually faxed to  [facility] at [phone].

## 2024-07-30 ENCOUNTER — DOCUMENTATION ONLY (OUTPATIENT)
Dept: INTERNAL MEDICINE | Facility: CLINIC | Age: 54
End: 2024-07-30
Payer: COMMERCIAL

## 2024-07-30 DIAGNOSIS — Z01.818 PREOP GENERAL PHYSICAL EXAM: Primary | ICD-10-CM

## 2024-07-30 NOTE — PROGRESS NOTES
Emiliana Woodall has an upcoming lab appointment:    Future Appointments   Date Time Provider Department Center   7/31/2024 10:15 AM [facility]     Patient is scheduled for the following lab(s):   Scheduling Notes: LABS   Made On: 7/29/2024 4:23 PM By: TRENTON GODINEZ       There is no order available. Please review and place either future orders or HMPO (Review of Health Maintenance Protocol Orders), as appropriate.    There are no preventive care reminders to display for this patient.    If no labs are needed at this time please have the Care Team contact patient regarding this information and cancel lab appointment. Thank you.     Marsha LEWIS

## 2024-07-31 ENCOUNTER — LAB (OUTPATIENT)
Dept: LAB | Facility: CLINIC | Age: 54
End: 2024-07-31
Payer: COMMERCIAL

## 2024-07-31 DIAGNOSIS — Z01.818 PREOP GENERAL PHYSICAL EXAM: ICD-10-CM

## 2024-07-31 LAB
ERYTHROCYTE [DISTWIDTH] IN BLOOD BY AUTOMATED COUNT: 12.9 % (ref 10–15)
HCT VFR BLD AUTO: 38.2 % (ref 35–47)
HGB BLD-MCNC: 13 G/DL (ref 11.7–15.7)
MCH RBC QN AUTO: 28.8 PG (ref 26.5–33)
MCHC RBC AUTO-ENTMCNC: 34 G/DL (ref 31.5–36.5)
MCV RBC AUTO: 85 FL (ref 78–100)
PLATELET # BLD AUTO: 459 10E3/UL (ref 150–450)
RBC # BLD AUTO: 4.51 10E6/UL (ref 3.8–5.2)
WBC # BLD AUTO: 9 10E3/UL (ref 4–11)

## 2024-07-31 PROCEDURE — 85027 COMPLETE CBC AUTOMATED: CPT

## 2024-07-31 PROCEDURE — 36415 COLL VENOUS BLD VENIPUNCTURE: CPT

## 2024-08-12 ENCOUNTER — TRANSFERRED RECORDS (OUTPATIENT)
Dept: HEALTH INFORMATION MANAGEMENT | Facility: CLINIC | Age: 54
End: 2024-08-12
Payer: COMMERCIAL

## 2024-08-28 ENCOUNTER — TRANSFERRED RECORDS (OUTPATIENT)
Dept: HEALTH INFORMATION MANAGEMENT | Facility: CLINIC | Age: 54
End: 2024-08-28
Payer: COMMERCIAL

## 2024-09-25 ENCOUNTER — TRANSFERRED RECORDS (OUTPATIENT)
Dept: HEALTH INFORMATION MANAGEMENT | Facility: CLINIC | Age: 54
End: 2024-09-25
Payer: COMMERCIAL

## 2024-10-10 ENCOUNTER — MYC MEDICAL ADVICE (OUTPATIENT)
Dept: INTERNAL MEDICINE | Facility: CLINIC | Age: 54
End: 2024-10-10
Payer: COMMERCIAL

## 2024-10-10 ENCOUNTER — TRANSFERRED RECORDS (OUTPATIENT)
Dept: HEALTH INFORMATION MANAGEMENT | Facility: CLINIC | Age: 54
End: 2024-10-10
Payer: COMMERCIAL

## 2024-10-10 DIAGNOSIS — E66.01 MORBID OBESITY (H): Primary | ICD-10-CM

## 2025-03-10 SDOH — HEALTH STABILITY: PHYSICAL HEALTH: ON AVERAGE, HOW MANY MINUTES DO YOU ENGAGE IN EXERCISE AT THIS LEVEL?: 60 MIN

## 2025-03-10 SDOH — HEALTH STABILITY: PHYSICAL HEALTH: ON AVERAGE, HOW MANY DAYS PER WEEK DO YOU ENGAGE IN MODERATE TO STRENUOUS EXERCISE (LIKE A BRISK WALK)?: 3 DAYS

## 2025-03-10 ASSESSMENT — SOCIAL DETERMINANTS OF HEALTH (SDOH): HOW OFTEN DO YOU GET TOGETHER WITH FRIENDS OR RELATIVES?: TWICE A WEEK

## 2025-03-13 ENCOUNTER — OFFICE VISIT (OUTPATIENT)
Dept: INTERNAL MEDICINE | Facility: CLINIC | Age: 55
End: 2025-03-13
Payer: COMMERCIAL

## 2025-03-13 ENCOUNTER — ORDERS ONLY (AUTO-RELEASED) (OUTPATIENT)
Dept: INTERNAL MEDICINE | Facility: CLINIC | Age: 55
End: 2025-03-13

## 2025-03-13 VITALS
RESPIRATION RATE: 12 BRPM | HEIGHT: 63 IN | TEMPERATURE: 97 F | HEART RATE: 79 BPM | DIASTOLIC BLOOD PRESSURE: 72 MMHG | SYSTOLIC BLOOD PRESSURE: 114 MMHG | WEIGHT: 198.5 LBS | BODY MASS INDEX: 35.17 KG/M2 | OXYGEN SATURATION: 98 %

## 2025-03-13 DIAGNOSIS — Z12.11 SCREENING FOR COLORECTAL CANCER: ICD-10-CM

## 2025-03-13 DIAGNOSIS — Z12.12 SCREENING FOR COLORECTAL CANCER: ICD-10-CM

## 2025-03-13 DIAGNOSIS — E78.2 MIXED HYPERLIPIDEMIA: ICD-10-CM

## 2025-03-13 DIAGNOSIS — Z00.00 ROUTINE GENERAL MEDICAL EXAMINATION AT A HEALTH CARE FACILITY: Primary | ICD-10-CM

## 2025-03-13 LAB
EST. AVERAGE GLUCOSE BLD GHB EST-MCNC: 103 MG/DL
HBA1C MFR BLD: 5.2 % (ref 0–5.6)

## 2025-03-13 NOTE — PATIENT INSTRUCTIONS
Patient Education   Preventive Care Advice   This is general advice given by our system to help you stay healthy. However, your care team may have specific advice just for you. Please talk to your care team about your preventive care needs.  Nutrition  Eat 5 or more servings of fruits and vegetables each day.  Try wheat bread, brown rice and whole grain pasta (instead of white bread, rice, and pasta).  Get enough calcium and vitamin D. Check the label on foods and aim for 100% of the RDA (recommended daily allowance).  Lifestyle  Exercise at least 150 minutes each week  (30 minutes a day, 5 days a week).  Do muscle strengthening activities 2 days a week. These help control your weight and prevent disease.  No smoking.  Wear sunscreen to prevent skin cancer.  Have a dental exam and cleaning every 6 months.  Yearly exams  See your health care team every year to talk about:  Any changes in your health.  Any medicines your care team has prescribed.  Preventive care, family planning, and ways to prevent chronic diseases.  Shots (vaccines)   HPV shots (up to age 26), if you've never had them before.  Hepatitis B shots (up to age 59), if you've never had them before.  COVID-19 shot: Get this shot when it's due.  Flu shot: Get a flu shot every year.  Tetanus shot: Get a tetanus shot every 10 years.  Pneumococcal, hepatitis A, and RSV shots: Ask your care team if you need these based on your risk.  Shingles shot (for age 50 and up)  General health tests  Diabetes screening:  Starting at age 35, Get screened for diabetes at least every 3 years.  If you are younger than age 35, ask your care team if you should be screened for diabetes.  Cholesterol test: At age 39, start having a cholesterol test every 5 years, or more often if advised.  Bone density scan (DEXA): At age 50, ask your care team if you should have this scan for osteoporosis (brittle bones).  Hepatitis C: Get tested at least once in your life.  STIs (sexually  transmitted infections)  Before age 24: Ask your care team if you should be screened for STIs.  After age 24: Get screened for STIs if you're at risk. You are at risk for STIs (including HIV) if:  You are sexually active with more than one person.  You don't use condoms every time.  You or a partner was diagnosed with a sexually transmitted infection.  If you are at risk for HIV, ask about PrEP medicine to prevent HIV.  Get tested for HIV at least once in your life, whether you are at risk for HIV or not.  Cancer screening tests  Cervical cancer screening: If you have a cervix, begin getting regular cervical cancer screening tests starting at age 21.  Breast cancer scan (mammogram): If you've ever had breasts, begin having regular mammograms starting at age 40. This is a scan to check for breast cancer.  Colon cancer screening: It is important to start screening for colon cancer at age 45.  Have a colonoscopy test every 10 years (or more often if you're at risk) Or, ask your provider about stool tests like a FIT test every year or Cologuard test every 3 years.  To learn more about your testing options, visit:   .  For help making a decision, visit:   https://bit.ly/in89311.  Prostate cancer screening test: If you have a prostate, ask your care team if a prostate cancer screening test (PSA) at age 55 is right for you.  Lung cancer screening: If you are a current or former smoker ages 50 to 80, ask your care team if ongoing lung cancer screenings are right for you.  For informational purposes only. Not to replace the advice of your health care provider. Copyright   2023 [facility]. All rights reserved. Clinically reviewed by the [facility]. EveryMove 581562 - REV 01/24.  Safer Sex: Care Instructions  Overview  Safer sex is a way to reduce your risk of getting a sexually transmitted infection (STI). It can also help prevent pregnancy.  Several products can help you practice  safer sex and reduce your chance of STIs. One of the best is a condom. There are internal and external condoms. You can use a special rubber sheet (dental dam) for protection during oral sex. Disposable gloves can keep your hands from touching blood, semen, or other body fluids that can carry infections.  Remember that birth control methods such as diaphragms, IUDs, foams, and birth control pills do not stop you from getting STIs.  Follow-up care is a key part of your treatment and safety. Be sure to make and go to all appointments, and call your doctor if you are having problems. It's also a good idea to know your test results and keep a list of the medicines you take.  How can you care for yourself at home?  Think about getting vaccinated to help prevent hepatitis A, hepatitis B, and human papillomavirus (HPV). They can be spread through sex.  Use a condom every time you have sex. Use an external condom, which goes on the penis. Or use an internal condom, which goes into the vagina or anus.  Make sure you use the right size external condom. A condom that's too small can break easily. A condom that's too big can slip off during sex.  Use a new condom each time you have sex. Be careful not to poke a hole in the condom when you open the wrapper.  Don't use an internal condom and an external condom at the same time.  Never use petroleum jelly (such as Vaseline), grease, hand lotion, baby oil, or anything with oil in it. These products can make holes in the condom.  After intercourse, hold the edge of the condom as you remove it. This will help keep semen from spilling out of the condom.  Do not have sex with anyone who has symptoms of an STI, such as sores on the genitals or mouth.  Do not drink a lot of alcohol or use drugs before sex.  Limit your sex partners. Sex with one partner who has sex only with you can reduce your risk of getting an STI.  Don't share sex toys. But if you do share them, use a condom and clean  "the sex toys between each use.  Talk to any partners before you have sex. Talk about what you feel comfortable with and whether you have any boundaries with sex. And find out if your partner or partners may be at risk for any STI. Keep in mind that a person may be able to spread an STI even if they do not have symptoms. You and any partners may want to get tested for STIs.  Where can you learn more?  Go to https://www.[employer].net/patiented  Enter B608 in the search box to learn more about \"Safer Sex: Care Instructions.\"  Current as of: April 30, 2024  Content Version: 14.3    2024 [employer].   Care instructions adapted under license by your healthcare professional. If you have questions about a medical condition or this instruction, always ask your healthcare professional. [employer] disclaims any warranty or liability for your use of this information.       "

## 2025-03-13 NOTE — PROGRESS NOTES
"Preventive Care Visit  [facility]  Clint Bronson MD, Internal Medicine  Mar 13, 2025      Assessment & Plan     Routine general medical examination at a health care facility      Body mass index (BMI) 40.0-44.9, adult (H)  Wegovy dose increased to 2.4 mg  Labs ordered      Mixed hyperlipidemia  Continue diet control  Lipid panel ordered,  Patient does not want to start a medication yet  If LDL is over 190, I explained that she might need a medication.  Her heart risk score is low    Screening for colorectal cancer  Cologuard ordered      Patient has been advised of split billing requirements and indicates understanding: Yes        BMI  Estimated body mass index is 35.73 kg/m  as calculated from the following:    Height as of this encounter: 1.588 m (5' 2.5\").    Weight as of this encounter: 90 kg (198 lb 8 oz).   Weight management plan: Discussed healthy diet and exercise guidelines    Counseling  Appropriate preventive services were addressed with this patient via screening, questionnaire, or discussion as appropriate for fall prevention, nutrition, physical activity, Tobacco-use cessation, social engagement, weight loss and cognition.  Checklist reviewing preventive services available has been given to the patient.  Reviewed patient's diet, addressing concerns and/or questions.   She is at risk for lack of exercise and has been provided with information to increase physical activity for the benefit of her well-being.           Cesar Pires is a 55 year old, presenting for the following:  Physical         HPI       Lost 15 pounds on Wegovy.    Feels like it is plateauing and wants the dose increased.       cholesterol was higher on last lab but she has been working on diet and exercise.        Advance Care Planning  Patient does not have a Health Care Directive:       3/10/2025   General Health   How would you rate your overall physical health? Good   Feel stress (tense, anxious, or " unable to sleep) Only a little   (!) STRESS CONCERN      3/10/2025   Nutrition   Three or more servings of calcium each day? Yes   Diet: Other   If other, please elaborate: Pescatarian (Fish, eggs, but no other meat). I do eat fruits, veggies, grains. I try to eat low sugar and low carbs.   How many servings of fruit and vegetables per day? (!) 2-3   How many sweetened beverages each day? 0-1         3/10/2025   Exercise   Days per week of moderate/strenous exercise 3 days   Average minutes spent exercising at this level 60 min         3/10/2025   Social Factors   Frequency of gathering with friends or relatives Twice a week   Worry food won't last until get money to buy more No   Food not last or not have enough money for food? No   Do you have housing? (Housing is defined as stable permanent housing and does not include staying ouside in a car, in a tent, in an abandoned building, in an overnight shelter, or couch-surfing.) Yes   Are you worried about losing your housing? No   Lack of transportation? No   Unable to get utilities (heat,electricity)? No         3/10/2025   Fall Risk   Fallen 2 or more times in the past year? No   Trouble with walking or balance? No          3/10/2025   Dental   Dentist two times every year? Yes           4/12/2024   TB Screening   Were you born outside of the [place]? No           Today's PHQ-2 Score:       3/13/2025     8:06 AM   PHQ-2 ( 1999 [employer])   Q1: Little interest or pleasure in doing things 0   Q2: Feeling down, depressed or hopeless 0   PHQ-2 Score 0    Q1: Little interest or pleasure in doing things Not at all   Q2: Feeling down, depressed or hopeless Not at all   PHQ-2 Score 0       Patient-reported           3/10/2025   Substance Use   Alcohol more than 3/day or more than 7/wk Not Applicable   Do you use any other substances recreationally? No     Social History     Tobacco Use    Smoking status: Never    Smokeless tobacco: Never   Vaping Use    Vaping status: Never Used    Substance Use Topics    Alcohol use: Not Currently     Comment: I have a glass of wine maybe once a year    Drug use: No           3/13/2025   LAST FHS-7 RESULTS   1st degree relative breast or ovarian cancer No   Any relative bilateral breast cancer No   Any male have breast cancer No   Any ONE woman have BOTH breast AND ovarian cancer No   Any woman with breast cancer before 50yrs No   2 or more relatives with breast AND/OR ovarian cancer No   2 or more relatives with breast AND/OR bowel cancer No        Mammogram Screening - Mammogram every 1-2 years updated in Health Maintenance based on mutual decision making        3/10/2025   STI Screening   New sexual partner(s) since last STI/HIV test? (!) YES      History of abnormal Pap smear: No - age 30-64 HPV with reflex Pap every 5 years recommended        Latest Ref Rng & Units 4/19/2024     7:40 AM 10/29/2021     8:45 AM 8/31/2016     8:35 AM   PAP / HPV   PAP  Negative for Intraepithelial Lesion or Malignancy (NILM)  Negative for Intraepithelial Lesion or Malignancy (NILM)     HPV 16 DNA Negative Negative  Negative  Negative    HPV 18 DNA Negative Negative  Negative  Negative    Other HR HPV Negative Negative  Negative  Negative      ASCVD Risk   The 10-year ASCVD risk score (Chiquita CASTILLO, et al., 2019) is: 2.3%    Values used to calculate the score:      Age: 55 years      Sex: Female      Is Non- : No      Diabetic: No      Tobacco smoker: No      Systolic Blood Pressure: 114 mmHg      Is BP treated: No      HDL Cholesterol: 47 mg/dL      Total Cholesterol: 247 mg/dL           Reviewed and updated as needed this visit by Provider                    Past Medical History:   Diagnosis Date    ASCUS with positive high risk HPV 06/02/2005    #45 high risk    Atypical squamous cells of undetermined significance (ASCUS) on Papanicolaou smear of cervix 07/10/2003    neg HPV    History of colposcopy with cervical biopsy 06/23/2005    JUSTINE 1  "   Hypertrophy of tonsil with adenoids 01/01/1977    Low risk HPV infection 04/01/2013    RECUR DISLOCAT-shoulder left     Rheumatic pneumonia(517.1) 01/01/1973    rheumatic fever         Review of Systems  Constitutional, HEENT, cardiovascular, pulmonary, GI, , musculoskeletal, neuro, skin, endocrine and psych systems are negative, except as otherwise noted.     Objective    Exam  /72   Pulse 79   Temp 97  F (36.1  C) (Temporal)   Resp 12   Ht 1.588 m (5' 2.5\")   Wt 90 kg (198 lb 8 oz)   SpO2 98%   BMI 35.73 kg/m     Estimated body mass index is 35.73 kg/m  as calculated from the following:    Height as of this encounter: 1.588 m (5' 2.5\").    Weight as of this encounter: 90 kg (198 lb 8 oz).    Physical Exam  GENERAL: alert and no distress  EYES: Eyes grossly normal to inspection, PERRL and conjunctivae and sclerae normal  HENT: ear canals and TM's normal, nose and mouth without ulcers or lesions  NECK: no adenopathy, no asymmetry, masses, or scars  RESP: lungs clear to auscultation - no rales, rhonchi or wheezes  CV: regular rate and rhythm, normal S1 S2, no S3 or S4, no murmur, click or rub, no peripheral edema  ABDOMEN: soft, nontender, no hepatosplenomegaly, no masses and bowel sounds normal  MS: no gross musculoskeletal defects noted, no edema  SKIN: no suspicious lesions or rashes  NEURO: Normal strength and tone, mentation intact and speech normal  PSYCH: mentation appears normal, affect normal/bright        Signed Electronically by: Clint Bronson MD    "

## 2025-03-15 LAB
CHOLEST SERPL-MCNC: 238 MG/DL
FASTING STATUS PATIENT QL REPORTED: YES
HDLC SERPL-MCNC: 45 MG/DL
LDLC SERPL CALC-MCNC: 176 MG/DL
NONHDLC SERPL-MCNC: 193 MG/DL
TRIGL SERPL-MCNC: 87 MG/DL
TSH SERPL DL<=0.005 MIU/L-ACNC: 1.16 UIU/ML (ref 0.3–4.2)

## 2025-03-17 LAB
ALBUMIN SERPL BCG-MCNC: 4.4 G/DL (ref 3.5–5.2)
ALP SERPL-CCNC: 66 U/L (ref 40–150)
ALT SERPL W P-5'-P-CCNC: 24 U/L (ref 0–50)
ANION GAP SERPL CALCULATED.3IONS-SCNC: 14 MMOL/L (ref 7–15)
AST SERPL W P-5'-P-CCNC: 23 U/L (ref 0–45)
BILIRUB SERPL-MCNC: 0.5 MG/DL
BUN SERPL-MCNC: 13.6 MG/DL (ref 6–20)
CALCIUM SERPL-MCNC: 9.9 MG/DL (ref 8.8–10.4)
CHLORIDE SERPL-SCNC: 102 MMOL/L (ref 98–107)
CREAT SERPL-MCNC: 0.76 MG/DL (ref 0.51–0.95)
EGFRCR SERPLBLD CKD-EPI 2021: >90 ML/MIN/1.73M2
FASTING STATUS PATIENT QL REPORTED: YES
GLUCOSE SERPL-MCNC: 97 MG/DL (ref 70–99)
HCO3 SERPL-SCNC: 25 MMOL/L (ref 22–29)
POTASSIUM SERPL-SCNC: 4.6 MMOL/L (ref 3.4–5.3)
PROT SERPL-MCNC: 7.3 G/DL (ref 6.4–8.3)
SODIUM SERPL-SCNC: 141 MMOL/L (ref 135–145)

## 2025-04-09 LAB — NONINV COLON CA DNA+OCC BLD SCRN STL QL: NEGATIVE
